# Patient Record
Sex: MALE | Race: WHITE | NOT HISPANIC OR LATINO | URBAN - METROPOLITAN AREA
[De-identification: names, ages, dates, MRNs, and addresses within clinical notes are randomized per-mention and may not be internally consistent; named-entity substitution may affect disease eponyms.]

---

## 2022-09-06 ENCOUNTER — HOSPITAL ENCOUNTER (OUTPATIENT)
Dept: PHYSICAL THERAPY | Facility: HOSPITAL | Age: 62
Setting detail: THERAPIES SERIES
Discharge: HOME | End: 2022-09-06

## 2022-09-06 DIAGNOSIS — Z96.612 S/P REVERSE TOTAL SHOULDER ARTHROPLASTY, LEFT: Primary | ICD-10-CM

## 2022-09-06 PROCEDURE — 97140 MANUAL THERAPY 1/> REGIONS: CPT | Performed by: PHYSICAL THERAPIST

## 2022-09-06 PROCEDURE — 97161 PT EVAL LOW COMPLEX 20 MIN: CPT | Performed by: PHYSICAL THERAPIST

## 2022-09-06 NOTE — THERAPY EVALUATION
Outpatient Physical Therapy Ortho Initial Evaluation   [place]     Patient Name: Son Flannery  : 1960  MRN: [id number]  Today's Date: 2022      Visit Date: 2022    There is no problem list on file for this patient.       No past medical history on file.     No past surgical history on file.    Visit Dx:     ICD-10-CM ICD-9-CM   1. S/P reverse total shoulder arthroplasty, left  Z96.612 V43.61          Patient History     Row Name 22 1000             History    Chief Complaint Difficulty with daily activities;Tightness;Pain  -GC      Type of Pain Shoulder pain  left  -GC      Date Current Problem(s) Began 22  -GC      Brief Description of Current Complaint Pt reports several injuries to his left shoudler in the past with the most recent being several months ago when he slipped on a wet ramp while carrying a 50# bag of corn. He tried to catch himself with his left UE and felt a pull/pop in the left shoulder. He was seen by Dr. Ma who did an MRI and found a FCT and long head of the biceps tear. He underwent a left reverse TSA on . He has been in a sling since surgery. He is now refered for therapy.  -GC      Patient/Caregiver Goals Relieve pain;Return to prior level of function;Improve mobility;Improve strength  -GC      Patient's Rating of General Health Good  -GC      Hand Dominance right-handed  -GC      Occupation/sports/leisure activities shipping/, enjoys yard work  -      What clinical tests have you had for this problem? MRI  -GC      Results of Clinical Tests RCT, long head of biceps tear  -GC              Pain     Pain Location Shoulder  left  -GC      Pain at Present 0  no pain at rest  -GC      Pain at Best 0  -GC      Pain at Worst 8  -GC      Pain Frequency Intermittent  -GC      Pain Description Aching;Sharp;Shooting  -GC      What Performance Factors Make the Current Problem(s) WORSE? pt c/o momentary sharp, shooting pain when he moves his  left UE  -GC      What Performance Factors Make the Current Problem(s) BETTER? Pt has no pain at rest  -GC      Difficulties at work? pt is currently off work due to this surgery  -GC      Difficulties with ADL's? Pt requires assistance with dressing, bathing, grooming tasks at this time  -GC              Daily Activities    Primary Language English  -      How does patient learn best? Listening;Reading  -GC      Teaching needs identified Home Exercise Program;Management of Condition  -GC      Patient is concerned about/has problems with Difficulty with self care (i.e. bathing, dressing, toileting:;Grasping objects lifting;Performing home management (household chores, shopping, care of dependents);Performing job responsibilities/community activities (work, school,;Reaching over head;Repetitive movements of the hand, arm, shoulder  -GC      Does patient have problems with the following? None  -GC      Barriers to learning None  -GC      Functional Status bathing;dressing;grooming;mobility issues preventing performance of daily activities  -GC      Pt Participated in POC and Goals Yes  -GC              Safety    Are you being hurt, hit, or frightened by anyone at home or in your life? No  -GC      Are you being neglected by a caregiver No  -GC            User Key  (r) = Recorded By, (t) = Taken By, (c) = Cosigned By    Initials Name Provider Type    GC Orlando Mccracken, PT Physical Therapist                 PT Ortho     Row Name 09/06/22 1000       Posture/Observations    Posture/Observations Comments Pt initially seen with left UE in sling. He still has post-op dressing in place. There is mild periscapular, deltoid, and biceps atrophy  -GC       Left Upper Ext    Lt Shoulder Abduction PROM 97 degrees  -GC    Lt Shoulder Flexion PROM 98 degrees  -GC    Lt Shoulder External Rotation PROM 41 degrees  -GC    Lt Shoulder Internal Rotation PROM 41 degrees  -GC    Lt Elbow Extension/Flexion AROM WFL  -GC    Lt Elbow  Supination AROM WFL  -GC    Lt Elbow Pronation AROM WFL  -GC    Lt Wrist Flexion AROM WFL  -GC    Lt Wrist Extension AROM WFL  -GC       MMT (Manual Muscle Testing)    General MMT Comments no MMT performed due to post-op protocol  -       Sensation    Light Touch No apparent deficits  -          User Key  (r) = Recorded By, (t) = Taken By, (c) = Cosigned By    Initials Name Provider Type     Orlando Mccracken PT Physical Therapist                            Therapy Education  Given: HEP, Symptoms/condition management, Pain management  Program: New  How Provided: Verbal, Demonstration  Provided to: Patient, Caregiver  Level of Understanding: Teach back education performed, Verbalized, Demonstrated      PT OP Goals     Row Name 09/06/22 1000          PT Short Term Goals    STG Date to Achieve 10/04/22  -     STG 1 Decreae left shoulder pain to 4-5/10 with movement.  -     STG 2 Increase PROM of left shoulder to 145 degrees FLEX and ABD and 60 degrees ER and IR with testing.  -     STG 3 Increase AROM of left shoulder to 125 degrees FLEX and ABD and 45 degrees ER and IR with testing.  -     STG 4 Pt will be indpendent with his HEP issued by this therapist.  -            Long Term Goals    LTG Date to Achieve 11/01/22  -     LTG 1 Decreae left shoulder pain to 0-1/10 with movement.  -     LTG 2 Increase PROM of left shoulder to 160 degrees FLEX and ABD and 75 degrees ER and IR with testing.  -     LTG 3 Increase AROM of left shoulder to 150 degrees FLEX and ABD and 60 degrees ER and IR with testing.  -     LTG 4 Increase left shoulder girdle strength to at least 4+/5 all planes with testing.  -     LTG 5 Pt will be independent with all ADLs and have a Quick Dash score < 15.  -            Time Calculation    PT Goal Re-Cert Due Date 10/04/22  -           User Key  (r) = Recorded By, (t) = Taken By, (c) = Cosigned By    Initials Name Provider Type     Orlando Mccracken PT Physical Therapist                  PT Assessment/Plan     Row Name 09/06/22 1000          PT Assessment    Functional Limitations Limitation in home management;Limitations in community activities;Limitations in functional capacity and performance;Performance in leisure activities;Performance in self-care ADL;Performance in work activities  -     Impairments Range of motion;Pain;Muscle strength  -     Assessment Comments Pt presents one week s/p reverse TSA left shoulder. He has pain that he rates up to 8/10 when he gets the quick jolts of pain associated with movement of his shoulder. He has the expected decrease in his left UE ROM and strength as well as limited function due to the surgery.  -     Rehab Potential Good  -GC     Patient/caregiver participated in establishment of treatment plan and goals Yes  -GC     Patient would benefit from skilled therapy intervention Yes  -GC            PT Plan    PT Frequency 1x/week;2x/week  -     Predicted Duration of Therapy Intervention (PT) 8 weeks  -     Planned CPT's? PT EVAL LOW COMPLEXITY: 93314;PT THER PROC EA 15 MIN: 18253;PT MANUAL THERAPY EA 15 MIN: 31645;PT HOT OR COLD PACK TREAT MCARE;PT ELECTRICAL STIM UNATTEND:   -           User Key  (r) = Recorded By, (t) = Taken By, (c) = Cosigned By    Initials Name Provider Type     Orlando Mccracken, PT Physical Therapist                 Modalities     Row Name 09/06/22 1000             Moist Heat    MH Applied Yes  -      Location left shoulder with pt supine and pillow under left elbow and forearm  -      PT Moist Heat Minutes 10  -GC      MH Prior to Rx Yes  -            User Key  (r) = Recorded By, (t) = Taken By, (c) = Cosigned By    Initials Name Provider Type     Orlando Mccracken, PT Physical Therapist                Manual Rx (last 36 hours)     Manual Treatments     Row Name 09/06/22 1000             Manual Rx 1    Manual Rx 1 Location left shoulderr  -      Manual Rx 1 Type passive stretches in FLEX-ABD-ER-IR   -      Manual Rx 1 Duration 15 min  -            User Key  (r) = Recorded By, (t) = Taken By, (c) = Cosigned By    Initials Name Provider Type     Orlando Mccracken, PT Physical Therapist                            Outcome Measure Options: Quick DASH  Quick DASH  Open a tight or new jar.: Severe Difficulty  Do heavy household chores (e.g., wash walls, wash floors): Unable  Carry a shopping bag or briefcase: Unable  Wash your back: Unable  Use a knife to cut food: Severe Difficulty  Recreational activities in which you take some force or impact through your arm, should or hand (e.g. golf, hammering, tennis, etc.): Unable  During the past week, to what extent has your arm, shoulder, or hand problem interfered with your normal social activites with family, friends, neighbors or groups?: Extremely  During the past week, were you limited in your work or other regular daily activities as a result of your arm, shoulder or hand problem?: Unable  Arm, Shoulder, or hand pain: Moderate  Tingling (pins and needles) in your arm, shoulder, or hand: Mild  During the past week, how much difficulty have you had sleeping because of the pain in your arm, shoulder or hand?: Moderate Difficiculty  Number of Questions Answered: 11  Quick DASH Score: 79.55         Time Calculation:     Start Time: 1000  Stop Time: 1100  Time Calculation (min): 60 min  Untimed Charges  PT Moist Heat Minutes: 10  Total Minutes  Untimed Charges Total Minutes: 10   Total Minutes: 10     Therapy Charges for Today     Code Description Service Date Service Provider Modifiers Qty    42433675033 HC PT EVAL LOW COMPLEXITY 2 9/6/2022 Orlando Mccracken, PT GP 1    88085837977 HC PT MANUAL THERAPY EA 15 MIN 9/6/2022 Orlando Mccracken, PT GP 1          PT G-Codes  Outcome Measure Options: Quick DASH  Quick DASH Score: 79.55         Orlando Mccracken PT  9/6/2022

## 2022-09-09 ENCOUNTER — HOSPITAL ENCOUNTER (OUTPATIENT)
Dept: PHYSICAL THERAPY | Facility: HOSPITAL | Age: 62
Setting detail: THERAPIES SERIES
Discharge: HOME | End: 2022-09-09

## 2022-09-09 DIAGNOSIS — Z96.612 S/P REVERSE TOTAL SHOULDER ARTHROPLASTY, LEFT: Primary | ICD-10-CM

## 2022-09-09 PROCEDURE — 97140 MANUAL THERAPY 1/> REGIONS: CPT | Performed by: PHYSICAL THERAPIST

## 2022-09-09 NOTE — THERAPY TREATMENT NOTE
Outpatient Physical Therapy Ortho Treatment Note   [place]     Patient Name: Son Flannery  : 1960  MRN: [id number]  Today's Date: 2022      Visit Date: 2022    Visit Dx:    ICD-10-CM ICD-9-CM   1. S/P reverse total shoulder arthroplasty, left  Z96.612 V43.61       There is no problem list on file for this patient.       No past medical history on file.     No past surgical history on file.                     PT Assessment/Plan     Row Name 22 1145          PT Assessment    Assessment Comments Pt is doing well with increased shoulder range noted with his stretches.  -GC            PT Plan    PT Plan Comments Pt will be seen 2x week for stretching/ROM and progressing to strengthening per protocol.  -GC           User Key  (r) = Recorded By, (t) = Taken By, (c) = Cosigned By    Initials Name Provider Type    Orlando Snider, PT Physical Therapist                 Modalities     Row Name 22 1145             Subjective Comments    Subjective Comments Pt states her was not very sore after his first visit.  -GC              Moist Heat    MH Applied Yes  -GC      Location left shoulder with pt supine and pillow under left elbow and forearm  -GC      PT Moist Heat Minutes 10  -GC      MH Prior to Rx Yes  -GC              Functional Testing    Outcome Measure Options Quick DASH  -GC            User Key  (r) = Recorded By, (t) = Taken By, (c) = Cosigned By    Initials Name Provider Type    Orlando Snider, PT Physical Therapist               OP Exercises     Row Name 22 1145             Subjective Comments    Subjective Comments Pt states her was not very sore after his first visit.  -GC            User Key  (r) = Recorded By, (t) = Taken By, (c) = Cosigned By    Initials Name Provider Type    Orlando Snider, PT Physical Therapist                         Manual Rx (last 36 hours)     Manual Treatments     Row Name 22 1145             Manual Rx 1    Manual Rx 1 Location left  shoulderr  -GC      Manual Rx 1 Type passive stretches in FLEX-ABD-ER-IR  -GC      Manual Rx 1 Duration 15 min  -GC            User Key  (r) = Recorded By, (t) = Taken By, (c) = Cosigned By    Initials Name Provider Type     Orlando Mccracken, PT Physical Therapist                         Outcome Measure Options: Quick DASH         Time Calculation:   Start Time: 1145  Stop Time: 1223  Time Calculation (min): 38 min  Untimed Charges  PT Moist Heat Minutes: 10  Total Minutes  Untimed Charges Total Minutes: 10   Total Minutes: 10  Therapy Charges for Today     Code Description Service Date Service Provider Modifiers Qty    44203652440 HC PT MANUAL THERAPY EA 15 MIN 9/9/2022 Orlando Mccracken, PT GP 1          PT G-Codes  Outcome Measure Options: Quick DASH         Orlando Mccracken PT  9/9/2022

## 2022-09-13 ENCOUNTER — APPOINTMENT (OUTPATIENT)
Dept: PHYSICAL THERAPY | Facility: HOSPITAL | Age: 62
End: 2022-09-13

## 2022-09-16 ENCOUNTER — HOSPITAL ENCOUNTER (OUTPATIENT)
Dept: PHYSICAL THERAPY | Facility: HOSPITAL | Age: 62
Setting detail: THERAPIES SERIES
Discharge: HOME | End: 2022-09-16

## 2022-09-16 DIAGNOSIS — Z96.612 S/P REVERSE TOTAL SHOULDER ARTHROPLASTY, LEFT: Primary | ICD-10-CM

## 2022-09-16 PROCEDURE — 97140 MANUAL THERAPY 1/> REGIONS: CPT | Performed by: PHYSICAL THERAPIST

## 2022-09-16 NOTE — THERAPY TREATMENT NOTE
Outpatient Physical Therapy Ortho Treatment Note   [place]     Patient Name: Son Flannery  : 1960  MRN: [id number]  Today's Date: 2022      Visit Date: 2022    Visit Dx:    ICD-10-CM ICD-9-CM   1. S/P reverse total shoulder arthroplasty, left  Z96.612 V43.61       There is no problem list on file for this patient.       No past medical history on file.     No past surgical history on file.                     PT Assessment/Plan     Row Name 22 1130          PT Assessment    Assessment Comments Pt continues to show increasing shoulder range with his stretching.  -GC            PT Plan    PT Plan Comments Will continue 2x week for passive stretches. Will begin AAROM end of next week.  -GC           User Key  (r) = Recorded By, (t) = Taken By, (c) = Cosigned By    Initials Name Provider Type    Orlando Snider, PT Physical Therapist                 Modalities     Row Name 22 1130             Subjective Comments    Subjective Comments Pt states the doctor was pleased with his progress.  -GC              Moist Heat    MH Applied Yes  -GC      Location left shoulder with pt supine and pillow under left elbow and forearm  -GC      PT Moist Heat Minutes 10  -GC      MH Prior to Rx Yes  -GC              Functional Testing    Outcome Measure Options Quick DASH  -GC            User Key  (r) = Recorded By, (t) = Taken By, (c) = Cosigned By    Initials Name Provider Type    Orlando Snider, PT Physical Therapist               OP Exercises     Row Name 22 1130             Subjective Comments    Subjective Comments Pt states the doctor was pleased with his progress.  -GC            User Key  (r) = Recorded By, (t) = Taken By, (c) = Cosigned By    Initials Name Provider Type    Orlando Snider, PT Physical Therapist                         Manual Rx (last 36 hours)     Manual Treatments     Row Name 22 1130             Manual Rx 1    Manual Rx 1 Location left shoulderr  -GC       Manual Rx 1 Type passive stretches in FLEX-ABD-ER-IR  -GC      Manual Rx 1 Duration 15 min  -            User Key  (r) = Recorded By, (t) = Taken By, (c) = Cosigned By    Initials Name Provider Type    GC Orlando Mccracken, PT Physical Therapist                         Outcome Measure Options: Quick DASH         Time Calculation:   Start Time: 1130  Stop Time: 1206  Time Calculation (min): 36 min  Untimed Charges  PT Moist Heat Minutes: 10  Total Minutes  Untimed Charges Total Minutes: 10   Total Minutes: 10  Therapy Charges for Today     Code Description Service Date Service Provider Modifiers Qty    89371599730 HC PT MANUAL THERAPY EA 15 MIN 9/16/2022 Orlando Mccracken, PT GP 1          PT G-Codes  Outcome Measure Options: Quick DASH         Orlando Mccracken PT  9/16/2022

## 2022-09-19 ENCOUNTER — HOSPITAL ENCOUNTER (OUTPATIENT)
Dept: PHYSICAL THERAPY | Facility: HOSPITAL | Age: 62
Setting detail: THERAPIES SERIES
Discharge: HOME | End: 2022-09-19

## 2022-09-19 DIAGNOSIS — Z96.612 S/P REVERSE TOTAL SHOULDER ARTHROPLASTY, LEFT: Primary | ICD-10-CM

## 2022-09-19 PROCEDURE — 97140 MANUAL THERAPY 1/> REGIONS: CPT | Performed by: PHYSICAL THERAPIST

## 2022-09-19 NOTE — THERAPY TREATMENT NOTE
Outpatient Physical Therapy Ortho Treatment Note  [place]     Patient Name: Son Flannery  : 1960  MRN: [id number]  Today's Date: 2022      Visit Date: 2022    Visit Dx:    ICD-10-CM ICD-9-CM   1. S/P reverse total shoulder arthroplasty, left  Z96.612 V43.61       There is no problem list on file for this patient.       No past medical history on file.     No past surgical history on file.                     PT Assessment/Plan     Row Name 22 1010          PT Assessment    Assessment Comments Pt continues to have very good PROM of her shoulder.  -GC            PT Plan    PT Plan Comments Pt will begin AAROM later this week.  -GC           User Key  (r) = Recorded By, (t) = Taken By, (c) = Cosigned By    Initials Name Provider Type    Orlando Snider, MAGALIS Physical Therapist                 Modalities     Row Name 22 1010             Subjective Comments    Subjective Comments Pt states his shoulder is feeling pretty good.  -GC              Moist Heat    MH Applied Yes  -GC      Location left shoulder with pt supine and pillow under left elbow and forearm  -GC      PT Moist Heat Minutes 10  -GC      MH Prior to Rx Yes  -GC              Functional Testing    Outcome Measure Options Quick DASH  -GC            User Key  (r) = Recorded By, (t) = Taken By, (c) = Cosigned By    Initials Name Provider Type    Orlando Snider, PT Physical Therapist               OP Exercises     Row Name 22 1010             Subjective Comments    Subjective Comments Pt states his shoulder is feeling pretty good.  -GC            User Key  (r) = Recorded By, (t) = Taken By, (c) = Cosigned By    Initials Name Provider Type    Orlando Snider, PT Physical Therapist                         Manual Rx (last 36 hours)     Manual Treatments     Row Name 22 1010             Manual Rx 1    Manual Rx 1 Location left shoulderr  -GC      Manual Rx 1 Type passive stretches in FLEX-ABD-ER-IR  -GC       Manual Rx 1 Duration 15 min  -            User Key  (r) = Recorded By, (t) = Taken By, (c) = Cosigned By    Initials Name Provider Type    GC Orlando Mccracken, PT Physical Therapist                         Outcome Measure Options: Quick DASH         Time Calculation:   Start Time: 1010  Stop Time: 1047  Time Calculation (min): 37 min  Untimed Charges  PT Moist Heat Minutes: 10  Total Minutes  Untimed Charges Total Minutes: 10   Total Minutes: 10  Therapy Charges for Today     Code Description Service Date Service Provider Modifiers Qty    32806924393 HC PT MANUAL THERAPY EA 15 MIN 9/19/2022 Orlando Mccracken, PT GP 1          PT G-Codes  Outcome Measure Options: Quick VALE Mccracken PT  9/19/2022

## 2022-09-22 ENCOUNTER — HOSPITAL ENCOUNTER (OUTPATIENT)
Dept: PHYSICAL THERAPY | Facility: HOSPITAL | Age: 62
Setting detail: THERAPIES SERIES
Discharge: HOME | End: 2022-09-22

## 2022-09-22 DIAGNOSIS — Z96.612 S/P REVERSE TOTAL SHOULDER ARTHROPLASTY, LEFT: Primary | ICD-10-CM

## 2022-09-22 PROCEDURE — 97140 MANUAL THERAPY 1/> REGIONS: CPT | Performed by: PHYSICAL THERAPIST

## 2022-09-22 NOTE — THERAPY TREATMENT NOTE
Outpatient Physical Therapy Ortho Treatment Note   [place]     Patient Name: Son Flannery  : 1960  MRN: [id number]  Today's Date: 2022      Visit Date: 2022    Visit Dx:    ICD-10-CM ICD-9-CM   1. S/P reverse total shoulder arthroplasty, left  Z96.612 V43.61       There is no problem list on file for this patient.       No past medical history on file.     No past surgical history on file.                     PT Assessment/Plan     Row Name 22 1130          PT Assessment    Assessment Comments Pt tolerated AAROM exercises well.  -GC            PT Plan    PT Plan Comments Will continue therapy 2x weekly.  -GC           User Key  (r) = Recorded By, (t) = Taken By, (c) = Cosigned By    Initials Name Provider Type    GC Orlando Mccracken, PT Physical Therapist                 Modalities     Row Name 22 1130             Subjective Comments    Subjective Comments Pt voices no complaints regarding his shoulder.  -GC              Moist Heat    MH Applied Yes  -GC      Location left shoulder with pt supine and pillow under left elbow and forearm  -GC      PT Moist Heat Minutes 10  -GC      MH Prior to Rx Yes  -GC              Functional Testing    Outcome Measure Options Quick DASH  -GC            User Key  (r) = Recorded By, (t) = Taken By, (c) = Cosigned By    Initials Name Provider Type    GC Orlando Mccracken, PT Physical Therapist               OP Exercises     Row Name 22 1130             Subjective Comments    Subjective Comments Pt voices no complaints regarding his shoulder.  -GC              Exercise 1    Exercise Name 1 Pulley-FLEX  -GC      Cueing 1 Verbal;Tactile;Demo  -GC      Time 1 4 min  -GC              Exercise 2    Exercise Name 2 Pulley-scaption  -GC      Cueing 2 Verbal;Tactile  -GC      Time 2 4 min  -GC            User Key  (r) = Recorded By, (t) = Taken By, (c) = Cosigned By    Initials Name Provider Type    GC Orlando Mccracken, PT Physical Therapist                          Manual Rx (last 36 hours)     Manual Treatments     Row Name 09/22/22 1130             Manual Rx 1    Manual Rx 1 Location left shoulderr  -GC      Manual Rx 1 Type passive stretches in FLEX-ABD-ER-IR  -GC      Manual Rx 1 Duration 15 min  -GC            User Key  (r) = Recorded By, (t) = Taken By, (c) = Cosigned By    Initials Name Provider Type    GC Orlando Mccracken, PT Physical Therapist                         Outcome Measure Options: Quick DASH         Time Calculation:   Start Time: 1130  Stop Time: 1214  Time Calculation (min): 44 min  Untimed Charges  PT Moist Heat Minutes: 10  Total Minutes  Untimed Charges Total Minutes: 10   Total Minutes: 10  Therapy Charges for Today     Code Description Service Date Service Provider Modifiers Qty    34523471872 HC PT MANUAL THERAPY EA 15 MIN 9/22/2022 Orlando Mccracken, PT GP 1          PT G-Codes  Outcome Measure Options: Quick VALE Mccracken PT  9/22/2022

## 2022-09-27 ENCOUNTER — HOSPITAL ENCOUNTER (OUTPATIENT)
Dept: PHYSICAL THERAPY | Facility: HOSPITAL | Age: 62
Setting detail: THERAPIES SERIES
Discharge: HOME | End: 2022-09-27

## 2022-09-27 DIAGNOSIS — Z96.612 S/P REVERSE TOTAL SHOULDER ARTHROPLASTY, LEFT: Primary | ICD-10-CM

## 2022-09-27 PROCEDURE — 97140 MANUAL THERAPY 1/> REGIONS: CPT | Performed by: PHYSICAL THERAPIST

## 2022-09-27 PROCEDURE — 97110 THERAPEUTIC EXERCISES: CPT | Performed by: PHYSICAL THERAPIST

## 2022-09-27 NOTE — THERAPY TREATMENT NOTE
Outpatient Physical Therapy Ortho Treatment Note   [place]     Patient Name: Son Flannery  : 1960  MRN: [id number]  Today's Date: 2022      Visit Date: 2022    Visit Dx:    ICD-10-CM ICD-9-CM   1. S/P reverse total shoulder arthroplasty, left  Z96.612 V43.61       There is no problem list on file for this patient.       No past medical history on file.     No past surgical history on file.                     PT Assessment/Plan     Row Name 22 1015          PT Assessment    Assessment Comments Pt is doing well with inccreased PROM and AAROM o fhis left shoulder noted with his stretches.  -GC            PT Plan    PT Plan Comments Pt is to continue his HEP of stretches 1-2x daily. Has MD follow up this afternoon. Will continue as advised.  -GC           User Key  (r) = Recorded By, (t) = Taken By, (c) = Cosigned By    Initials Name Provider Type    GC Orlando Mccracken, PT Physical Therapist                 Modalities     Row Name 22 1015             Subjective Comments    Subjective Comments Pt states his shoulder is feeling pretty good. Just has a little soreness.  -GC              Moist Heat    MH Applied Yes  -GC      Location left shoulder with pt supine and pillow under left elbow and forearm  -GC      PT Moist Heat Minutes 10  -GC      MH Prior to Rx Yes  -GC              Functional Testing    Outcome Measure Options Quick DASH  -GC            User Key  (r) = Recorded By, (t) = Taken By, (c) = Cosigned By    Initials Name Provider Type    GC Orlando Mccracken, PT Physical Therapist               OP Exercises     Row Name 22 1015             Subjective Comments    Subjective Comments Pt states his shoulder is feeling pretty good. Just has a little soreness.  -GC              Exercise 1    Exercise Name 1 Pulley-FLEX  -GC      Cueing 1 Verbal;Tactile;Demo  -GC      Time 1 4 min  -GC              Exercise 2    Exercise Name 2 Pulley-scaption  -GC      Cueing 2 Verbal;Tactile   -GC      Time 2 4 min  -GC              Exercise 3    Exercise Name 3 Cane stretch-FLEX  -GC      Cueing 3 Verbal;Tactile;Demo  -GC      Reps 3 15  -GC      Time 3 5 secs  -GC              Exercise 4    Exercise Name 4 Cane stretch-ABD  -GC      Cueing 4 Verbal;Tactile;Demo  -GC      Reps 4 15  -GC      Time 4 5 secs  -GC              Exercise 5    Exercise Name 5 Cane stretch-ER  -GC      Cueing 5 Verbal;Tactile;Demo  -GC      Reps 5 15  -GC      Time 5 5 secs  -GC            User Key  (r) = Recorded By, (t) = Taken By, (c) = Cosigned By    Initials Name Provider Type     Orlando Mccracken, PT Physical Therapist                         Manual Rx (last 36 hours)     Manual Treatments     Row Name 09/27/22 1015             Manual Rx 1    Manual Rx 1 Location left shoulderr  -GC      Manual Rx 1 Type passive stretches in FLEX-ABD-ER-IR  -GC      Manual Rx 1 Duration 15 min  -GC            User Key  (r) = Recorded By, (t) = Taken By, (c) = Cosigned By    Initials Name Provider Type     Orlando Mccracken, PT Physical Therapist                         Outcome Measure Options: Quick DASH         Time Calculation:   Start Time: 1015  Stop Time: 1100  Time Calculation (min): 45 min  Untimed Charges  PT Moist Heat Minutes: 10  Total Minutes  Untimed Charges Total Minutes: 10   Total Minutes: 10  Therapy Charges for Today     Code Description Service Date Service Provider Modifiers Qty    44205937432  PT THER PROC EA 15 MIN 9/27/2022 Orlando Mccracken, PT GP 1    70413440896 HC PT MANUAL THERAPY EA 15 MIN 9/27/2022 Orlando Mccracken, PT GP 1          PT G-Codes  Outcome Measure Options: Quick DASH         Orlando Mccracken PT  9/27/2022

## 2022-09-29 ENCOUNTER — HOSPITAL ENCOUNTER (OUTPATIENT)
Dept: PHYSICAL THERAPY | Facility: HOSPITAL | Age: 62
Setting detail: THERAPIES SERIES
Discharge: HOME | End: 2022-09-29

## 2022-09-29 DIAGNOSIS — Z96.612 S/P REVERSE TOTAL SHOULDER ARTHROPLASTY, LEFT: Primary | ICD-10-CM

## 2022-09-29 PROCEDURE — 97110 THERAPEUTIC EXERCISES: CPT | Performed by: PHYSICAL THERAPIST

## 2022-09-29 PROCEDURE — 97140 MANUAL THERAPY 1/> REGIONS: CPT | Performed by: PHYSICAL THERAPIST

## 2022-09-29 NOTE — THERAPY TREATMENT NOTE
Outpatient Physical Therapy Ortho Treatment Note   [place]     Patient Name: Son Flannery  : 1960  MRN: [id number]  Today's Date: 2022      Visit Date: 2022    Visit Dx:    ICD-10-CM ICD-9-CM   1. S/P reverse total shoulder arthroplasty, left  Z96.612 V43.61       There is no problem list on file for this patient.       No past medical history on file.     No past surgical history on file.     PT Ortho     Row Name 22 1115       Posture/Observations    Posture/Observations Comments Pt is now out of sling per MD instruction  -GC          User Key  (r) = Recorded By, (t) = Taken By, (c) = Cosigned By    Initials Name Provider Type    Orlando Snider, PT Physical Therapist                             PT Assessment/Plan     Row Name 22 111          PT Assessment    Assessment Comments Pt is doing well with increasing shoulder range and good tolerance to his exercise progression.  -GC            PT Plan    PT Plan Comments Pt is to continue his HEP daily.  -GC           User Key  (r) = Recorded By, (t) = Taken By, (c) = Cosigned By    Initials Name Provider Type    Orlando Snider, PT Physical Therapist                 Modalities     Row Name 22 1115             Subjective Comments    Subjective Comments Pt states his shoulder is feeling pretty good.  -GC              Moist Heat    MH Applied Yes  -GC      Location left shoulder with pt supine and pillow under left elbow and forearm  -GC      PT Moist Heat Minutes 10  -GC      MH Prior to Rx Yes  -GC              Functional Testing    Outcome Measure Options Quick DASH  -GC            User Key  (r) = Recorded By, (t) = Taken By, (c) = Cosigned By    Initials Name Provider Type    Orlando Snider, PT Physical Therapist               OP Exercises     Row Name 22 1115             Subjective Comments    Subjective Comments Pt states his shoulder is feeling pretty good.  -GC              Exercise 1    Exercise Name 1  Pulley-FLEX  -GC      Cueing 1 Verbal;Tactile;Demo  -GC      Time 1 4 min  -GC              Exercise 2    Exercise Name 2 Pulley-scaption  -GC      Cueing 2 Verbal;Tactile  -GC      Time 2 4 min  -GC              Exercise 3    Exercise Name 3 Cane stretch-FLEX  -GC      Cueing 3 Verbal;Tactile;Demo  -GC      Reps 3 15  -GC      Time 3 5 secs  -GC              Exercise 4    Exercise Name 4 Cane stretch-ABD  -GC      Cueing 4 Verbal;Tactile;Demo  -GC      Reps 4 15  -GC      Time 4 5 secs  -GC              Exercise 5    Exercise Name 5 Cane stretch-ER  -GC      Cueing 5 Verbal;Tactile;Demo  -GC      Reps 5 15  -GC      Time 5 5 secs  -GC              Exercise 6    Exercise Name 6 Shoulder ER vs theraband  -GC      Cueing 6 Verbal;Tactile;Demo  -GC      Reps 6 25  -GC      Time 6 red  -GC              Exercise 7    Exercise Name 7 Shoulder IR vs theraband  -GC      Cueing 7 Verbal;Tactile;Demo  -GC      Reps 7 25  -GC      Time 7 red  -GC              Exercise 8    Exercise Name 8 Scaption Down  -GC      Cueing 8 Verbal;Tactile;Demo  -GC      Reps 8 25  -GC              Exercise 9    Exercise Name 9 Scaption Up  -GC      Cueing 9 Verbal;Tactile  -GC      Reps 9 25  -GC            User Key  (r) = Recorded By, (t) = Taken By, (c) = Cosigned By    Initials Name Provider Type    GC Orlando Mccracken, PT Physical Therapist                         Manual Rx (last 36 hours)     Manual Treatments     Row Name 09/29/22 1115             Manual Rx 1    Manual Rx 1 Location left shoulderr  -GC      Manual Rx 1 Type passive stretches in FLEX-ABD-ER-IR  -GC      Manual Rx 1 Duration 15 min  -GC            User Key  (r) = Recorded By, (t) = Taken By, (c) = Cosigned By    Initials Name Provider Type    GC Orlando Mccracken, PT Physical Therapist                         Outcome Measure Options: Quick DASH         Time Calculation:   Start Time: 1115  Stop Time: 1204  Time Calculation (min): 49 min  Untimed Charges  PT Moist Heat Minutes:  10  Total Minutes  Untimed Charges Total Minutes: 10   Total Minutes: 10  Therapy Charges for Today     Code Description Service Date Service Provider Modifiers Qty    58406723009 HC PT THER PROC EA 15 MIN 9/29/2022 Orlando Mccracken, PT GP 1    40980916804 HC PT MANUAL THERAPY EA 15 MIN 9/29/2022 Orlando Mccracken, PT GP 1          PT G-Codes  Outcome Measure Options: Quick DASH         Orlando Mccracken, PT  9/29/2022

## 2022-10-04 ENCOUNTER — APPOINTMENT (OUTPATIENT)
Dept: PHYSICAL THERAPY | Facility: HOSPITAL | Age: 62
End: 2022-10-04

## 2022-10-07 ENCOUNTER — HOSPITAL ENCOUNTER (OUTPATIENT)
Dept: PHYSICAL THERAPY | Facility: HOSPITAL | Age: 62
Setting detail: THERAPIES SERIES
Discharge: HOME | End: 2022-10-07

## 2022-10-07 DIAGNOSIS — Z96.612 S/P REVERSE TOTAL SHOULDER ARTHROPLASTY, LEFT: Primary | ICD-10-CM

## 2022-10-07 PROCEDURE — 97110 THERAPEUTIC EXERCISES: CPT | Performed by: PHYSICAL THERAPIST

## 2022-10-07 PROCEDURE — 97140 MANUAL THERAPY 1/> REGIONS: CPT | Performed by: PHYSICAL THERAPIST

## 2022-10-07 NOTE — THERAPY TREATMENT NOTE
Outpatient Physical Therapy Ortho Treatment Note   [place]     Patient Name: Son Flannery  : 1960  MRN: [id number]  Today's Date: 10/7/2022      Visit Date: 10/07/2022    Visit Dx:    ICD-10-CM ICD-9-CM   1. S/P reverse total shoulder arthroplasty, left  Z96.612 V43.61       There is no problem list on file for this patient.       No past medical history on file.     No past surgical history on file.                     PT Assessment/Plan     Row Name 10/07/22 1150          PT Assessment    Assessment Comments Pt is doing well with increasing shoulder range and good toelrance to his gentle strengthening.  -GC            PT Plan    PT Plan Comments Pt is to continue his HEP daily.  -GC           User Key  (r) = Recorded By, (t) = Taken By, (c) = Cosigned By    Initials Name Provider Type    GC Orlando Mccracken, PT Physical Therapist                 Modalities     Row Name 10/07/22 1150             Subjective Comments    Subjective Comments Pt states his shoulder is feeling pretty good.  -GC              Moist Heat    MH Applied Yes  -GC      Location left shoulder with pt supine and pillow under left elbow and forearm  -GC      PT Moist Heat Minutes 10  -GC      MH Prior to Rx Yes  -GC              Functional Testing    Outcome Measure Options Quick DASH  -GC            User Key  (r) = Recorded By, (t) = Taken By, (c) = Cosigned By    Initials Name Provider Type    GC Orlando Mccracken, PT Physical Therapist               OP Exercises     Row Name 10/07/22 1150             Subjective Comments    Subjective Comments Pt states his shoulder is feeling pretty good.  -GC              Exercise 1    Exercise Name 1 Pulley-FLEX  -GC      Cueing 1 Verbal;Tactile;Demo  -GC      Time 1 4 min  -GC              Exercise 2    Exercise Name 2 Pulley-scaption  -GC      Cueing 2 Verbal;Tactile  -GC      Time 2 4 min  -GC              Exercise 3    Exercise Name 3 Cane stretch-FLEX  -GC      Cueing 3 Verbal;Tactile;Demo  -GC       Reps 3 15  -GC      Time 3 5 secs  -GC              Exercise 4    Exercise Name 4 Cane stretch-ABD  -GC      Cueing 4 Verbal;Tactile;Demo  -GC      Reps 4 15  -GC      Time 4 5 secs  -GC              Exercise 5    Exercise Name 5 Cane stretch-ER  -GC      Cueing 5 Verbal;Tactile;Demo  -GC      Reps 5 15  -GC      Time 5 5 secs  -GC              Exercise 6    Exercise Name 6 Shoulder ER vs theraband  -GC      Cueing 6 Verbal;Tactile;Demo  -GC      Reps 6 25  -GC      Time 6 red  -GC              Exercise 7    Exercise Name 7 Shoulder IR vs theraband  -GC      Cueing 7 Verbal;Tactile;Demo  -GC      Reps 7 25  -GC      Time 7 red  -GC              Exercise 8    Exercise Name 8 Scaption Down  -GC      Cueing 8 Verbal;Tactile;Demo  -GC      Reps 8 25  -GC      Time 8 1#  -GC              Exercise 9    Exercise Name 9 Scaption Up  -GC      Cueing 9 Verbal;Tactile  -GC      Reps 9 25  -GC      Time 9 1#  -GC              Exercise 10    Exercise Name 10 shoulder rows vs theraband  -GC      Cueing 10 Verbal;Tactile;Demo  -GC      Reps 10 25  -GC      Time 10 red  -GC              Exercise 11    Exercise Name 11 shoulder EXT vs theraband  -GC      Cueing 11 Verbal;Tactile;Demo  -GC      Reps 11 25  -GC      Time 11 red  -GC            User Key  (r) = Recorded By, (t) = Taken By, (c) = Cosigned By    Initials Name Provider Type     Orlando Mccracken, PT Physical Therapist                         Manual Rx (last 36 hours)     Manual Treatments     Row Name 10/07/22 1150             Manual Rx 1    Manual Rx 1 Location left shoulderr  -GC      Manual Rx 1 Type passive stretches in FLEX-ABD-ER-IR  -GC      Manual Rx 1 Duration 15 min  -GC            User Key  (r) = Recorded By, (t) = Taken By, (c) = Cosigned By    Initials Name Provider Type    GC Costcarlyn, Orlando, PT Physical Therapist                         Outcome Measure Options: Quick DASH         Time Calculation:   Start Time: 1150  Stop Time: 1247  Time Calculation (min):  57 min  Untimed Charges  PT Moist Heat Minutes: 10  Total Minutes  Untimed Charges Total Minutes: 10   Total Minutes: 10  Therapy Charges for Today     Code Description Service Date Service Provider Modifiers Qty    65056898676 HC PT THER PROC EA 15 MIN 10/7/2022 Orlando Mccracken, PT GP 1    18391505476 HC PT MANUAL THERAPY EA 15 MIN 10/7/2022 Orlando Mccracken, PT GP 1          PT G-Codes  Outcome Measure Options: Quick DASH         Orlando Mccracken, PT  10/7/2022

## 2022-10-10 ENCOUNTER — HOSPITAL ENCOUNTER (OUTPATIENT)
Dept: PHYSICAL THERAPY | Facility: HOSPITAL | Age: 62
Setting detail: THERAPIES SERIES
Discharge: HOME | End: 2022-10-10

## 2022-10-10 DIAGNOSIS — Z96.612 S/P REVERSE TOTAL SHOULDER ARTHROPLASTY, LEFT: Primary | ICD-10-CM

## 2022-10-10 PROCEDURE — 97140 MANUAL THERAPY 1/> REGIONS: CPT | Performed by: PHYSICAL THERAPIST

## 2022-10-10 PROCEDURE — 97110 THERAPEUTIC EXERCISES: CPT | Performed by: PHYSICAL THERAPIST

## 2022-10-10 NOTE — THERAPY TREATMENT NOTE
Outpatient Physical Therapy Ortho Treatment Note  [place]     Patient Name: Son Flannery  : 1960  MRN: [id number]  Today's Date: 10/10/2022      Visit Date: 10/10/2022    Visit Dx:    ICD-10-CM ICD-9-CM   1. S/P reverse total shoulder arthroplasty, left  Z96.612 V43.61       There is no problem list on file for this patient.       No past medical history on file.     No past surgical history on file.                     PT Assessment/Plan     Row Name 10/10/22 1200          PT Assessment    Assessment Comments Pt is doing well with good tolerance to his exercise progression.  -GC        PT Plan    PT Plan Comments Pt is to continue his HEP 2x daily.  -GC           User Key  (r) = Recorded By, (t) = Taken By, (c) = Cosigned By    Initials Name Provider Type    Orlando Snider, PT Physical Therapist                   OP Exercises     Row Name 10/10/22 1200             Subjective Comments    Subjective Comments Pt states his shoulder feels pretty good.  -GC         Exercise 1    Exercise Name 1 Pulley-FLEX  -GC      Cueing 1 Verbal;Tactile;Demo  -GC      Time 1 4 min  -GC         Exercise 2    Exercise Name 2 Pulley-scaption  -GC      Cueing 2 Verbal;Tactile  -GC      Time 2 4 min  -GC         Exercise 3    Exercise Name 3 Cane stretch-FLEX  -GC      Cueing 3 Verbal;Tactile;Demo  -GC      Reps 3 15  -GC      Time 3 5 secs  -GC         Exercise 4    Exercise Name 4 Cane stretch-ABD  -GC      Cueing 4 Verbal;Tactile;Demo  -GC      Reps 4 15  -GC      Time 4 5 secs  -GC         Exercise 5    Exercise Name 5 Cane stretch-ER  -GC      Cueing 5 Verbal;Tactile;Demo  -GC      Reps 5 15  -GC      Time 5 5 secs  -GC         Exercise 6    Exercise Name 6 Shoulder ER vs theraband  -GC      Cueing 6 Verbal;Tactile;Demo  -GC      Reps 6 25  -GC      Time 6 green  -GC         Exercise 7    Exercise Name 7 Shoulder IR vs theraband  -GC      Cueing 7 Verbal;Tactile;Demo  -GC      Reps 7 25  -GC      Time 7 green  -GC          Exercise 8    Exercise Name 8 Scaption Down  -GC      Cueing 8 Verbal;Tactile;Demo  -GC      Reps 8 25  -GC      Time 8 1#  -GC         Exercise 9    Exercise Name 9 Scaption Up  -GC      Cueing 9 Verbal;Tactile  -GC      Reps 9 25  -GC      Time 9 1#  -GC         Exercise 10    Exercise Name 10 shoulder rows vs theraband  -GC      Cueing 10 Verbal;Tactile;Demo  -GC      Reps 10 25  -GC      Time 10 green  -GC         Exercise 11    Exercise Name 11 shoulder EXT vs theraband  -GC      Cueing 11 Verbal;Tactile;Demo  -GC      Reps 11 25  -GC      Time 11 green  -GC            User Key  (r) = Recorded By, (t) = Taken By, (c) = Cosigned By    Initials Name Provider Type     Orlando Mccracken, PT Physical Therapist                         Manual Rx (last 36 hours)     Manual Treatments     Row Name 10/10/22 1200             Manual Rx 1    Manual Rx 1 Location left shoulderr  -GC      Manual Rx 1 Type passive stretches in FLEX-ABD-ER-IR  -GC      Manual Rx 1 Duration 15 min  -GC            User Key  (r) = Recorded By, (t) = Taken By, (c) = Cosigned By    Initials Name Provider Type     Orlando Mccracken, PT Physical Therapist                                   Time Calculation:   Start Time: 1200  Stop Time: 1304  Time Calculation (min): 64 min  Therapy Charges for Today     Code Description Service Date Service Provider Modifiers Qty    25475558309  PT THER PROC EA 15 MIN 10/10/2022 Orlando Mccracken, PT GP 1    [id number]  PT MANUAL THERAPY EA 15 MIN 10/10/2022 Orlando Mccracken, PT GP 1                    Orlando Mccracken PT  10/10/2022

## 2022-10-14 ENCOUNTER — HOSPITAL ENCOUNTER (OUTPATIENT)
Dept: PHYSICAL THERAPY | Facility: HOSPITAL | Age: 62
Setting detail: THERAPIES SERIES
Discharge: HOME | End: 2022-10-14

## 2022-10-14 DIAGNOSIS — Z96.612 S/P REVERSE TOTAL SHOULDER ARTHROPLASTY, LEFT: Primary | ICD-10-CM

## 2022-10-14 PROCEDURE — 97140 MANUAL THERAPY 1/> REGIONS: CPT | Performed by: PHYSICAL THERAPIST

## 2022-10-14 PROCEDURE — 97110 THERAPEUTIC EXERCISES: CPT | Performed by: PHYSICAL THERAPIST

## 2022-10-14 NOTE — THERAPY TREATMENT NOTE
Outpatient Physical Therapy [facility] Treatment Note  [place]     Patient Name: Son Flannery  : 1960  MRN: [id number]  Today's Date: 10/14/2022      Visit Date: 10/14/2022    Visit Dx:    ICD-10-CM ICD-9-CM   1. S/P reverse total shoulder arthroplasty, left  Z96.612 V43.61       There is no problem list on file for this patient.       No past medical history on file.     No past surgical history on file.                     PT Assessment/Plan     Row Name 10/14/22 1030          PT Assessment    Assessment Comments Pt is doing well with increasing range and good tolerance to his exercise progression.  -GC        PT Plan    PT Plan Comments Pt is to continue his HEP daily.  -GC           User Key  (r) = Recorded By, (t) = Taken By, (c) = Cosigned By    Initials Name Provider Type    GC Orlando Mccracken, PT Physical Therapist                 Modalities     Row Name 10/14/22 1030             Subjective Comments    Subjective Comments Pt states his shoulder is feleing pretty good.  -GC         Moist Heat    MH Applied Yes  -GC      Location left shoulder with pt supine and pillow under left elbow and forearm  -GC      PT Moist Heat Minutes 10  -GC      MH Prior to Rx Yes  -GC         Functional Testing    Outcome Measure Options Quick DASH  -GC            User Key  (r) = Recorded By, (t) = Taken By, (c) = Cosigned By    Initials Name Provider Type    GC Orlando Mccracken, PT Physical Therapist               OP Exercises     Row Name 10/14/22 1030             Subjective Comments    Subjective Comments Pt states his shoulder is feleing pretty good.  -GC         Exercise 1    Exercise Name 1 Pulley-FLEX  -GC      Cueing 1 Verbal;Tactile;Demo  -GC      Time 1 4 min  -GC         Exercise 2    Exercise Name 2 Pulley-scaption  -GC      Cueing 2 Verbal;Tactile  -GC      Time 2 4 min  -GC         Exercise 3    Exercise Name 3 Cane stretch-FLEX  -GC      Cueing 3 Verbal;Tactile;Demo  -GC      Reps 3 15  -GC      Time 3 5 secs   -GC         Exercise 4    Exercise Name 4 Cane stretch-ABD  -GC      Cueing 4 Verbal;Tactile;Demo  -GC      Reps 4 15  -GC      Time 4 5 secs  -GC         Exercise 5    Exercise Name 5 Cane stretch-ER  -GC      Cueing 5 Verbal;Tactile;Demo  -GC      Reps 5 15  -GC      Time 5 5 secs  -GC         Exercise 6    Exercise Name 6 Shoulder ER vs theraband  -GC      Cueing 6 Verbal;Tactile;Demo  -GC      Reps 6 25  -GC      Time 6 green  -GC         Exercise 7    Exercise Name 7 Shoulder IR vs theraband  -GC      Cueing 7 Verbal;Tactile;Demo  -GC      Reps 7 25  -GC      Time 7 green  -GC         Exercise 8    Exercise Name 8 Scaption Down  -GC      Cueing 8 Verbal;Tactile;Demo  -GC      Reps 8 25  -GC      Time 8 2#  -GC         Exercise 9    Exercise Name 9 Scaption Up  -GC      Cueing 9 Verbal;Tactile  -GC      Reps 9 25  -GC      Time 9 2#  -GC         Exercise 10    Exercise Name 10 shoulder rows vs theraband  -GC      Cueing 10 Verbal;Tactile;Demo  -GC      Reps 10 25  -GC      Time 10 green  -GC         Exercise 11    Exercise Name 11 shoulder EXT vs theraband  -GC      Cueing 11 Verbal;Tactile;Demo  -GC      Reps 11 25  -GC      Time 11 green  -GC         Exercise 12    Exercise Name 12 Sidelying ER  -GC      Cueing 12 Verbal;Tactile  -GC      Reps 12 25  -GC      Time 12 2#  -GC            User Key  (r) = Recorded By, (t) = Taken By, (c) = Cosigned By    Initials Name Provider Type     Orlando Mccracken, PT Physical Therapist                         Manual Rx (last 36 hours)     Manual Treatments     Row Name 10/14/22 1030             Manual Rx 1    Manual Rx 1 Location left shoulderr  -GC      Manual Rx 1 Type passive stretches in FLEX-ABD-ER-IR  -GC      Manual Rx 1 Duration 15 min  -GC            User Key  (r) = Recorded By, (t) = Taken By, (c) = Cosigned By    Initials Name Provider Type    GC Orlando Mccracken, PT Physical Therapist                         Outcome Measure Options: Quick DASH         Time  Calculation:   Start Time: 1030  Stop Time: 1122  Time Calculation (min): 52 min  Untimed Charges  PT Moist Heat Minutes: 10  Total Minutes  Untimed Charges Total Minutes: 10   Total Minutes: 10  Therapy Charges for Today     Code Description Service Date Service Provider Modifiers Qty    05157178714 HC PT THER PROC EA 15 MIN 10/14/2022 Orlando Mccracken, PT GP 1    11680311766 HC PT MANUAL THERAPY EA 15 MIN 10/14/2022 Orlando Mccracken, PT GP 1          PT G-Codes  Outcome Measure Options: Quick DASH         Orlando Mccracken, PT  10/14/2022

## 2022-10-18 ENCOUNTER — HOSPITAL ENCOUNTER (OUTPATIENT)
Dept: PHYSICAL THERAPY | Facility: HOSPITAL | Age: 62
Setting detail: THERAPIES SERIES
Discharge: HOME | End: 2022-10-18

## 2022-10-18 DIAGNOSIS — Z96.612 S/P REVERSE TOTAL SHOULDER ARTHROPLASTY, LEFT: Primary | ICD-10-CM

## 2022-10-18 PROCEDURE — 97140 MANUAL THERAPY 1/> REGIONS: CPT | Performed by: PHYSICAL THERAPIST

## 2022-10-18 PROCEDURE — 97110 THERAPEUTIC EXERCISES: CPT | Performed by: PHYSICAL THERAPIST

## 2022-10-18 NOTE — THERAPY TREATMENT NOTE
Outpatient Physical Therapy Ortho Treatment Note  [facility]     Patient Name: Son Flannery  : 1960  MRN: [id number]  Today's Date: 10/18/2022      Visit Date: 10/18/2022    Visit Dx:    ICD-10-CM ICD-9-CM   1. S/P reverse total shoulder arthroplasty, left  Z96.612 V43.61       There is no problem list on file for this patient.       No past medical history on file.     No past surgical history on file.                     PT Assessment/Plan     Row Name 10/18/22 1130          PT Assessment    Assessment Comments Pt is doing well with good PROM noted with his stretching. Feel soreness is expected with starting strengthening exercises.  -GC        PT Plan    PT Plan Comments Pt is to continue his HEP daily.  -GC           User Key  (r) = Recorded By, (t) = Taken By, (c) = Cosigned By    Initials Name Provider Type    GC Orlando Mccracken, PT Physical Therapist                 Modalities     Row Name 10/18/22 1130             Subjective Comments    Subjective Comments Pt states his shoulder is a little sore.  -GC         Moist Heat    MH Applied Yes  -GC      Location left shoulder with pt supine and pillow under left elbow and forearm  -GC      PT Moist Heat Minutes 10  -GC      MH Prior to Rx Yes  -GC         Functional Testing    Outcome Measure Options Quick DASH  -GC            User Key  (r) = Recorded By, (t) = Taken By, (c) = Cosigned By    Initials Name Provider Type    GC Orlando Mccracken, PT Physical Therapist               OP Exercises     Row Name 10/18/22 1130             Subjective Comments    Subjective Comments Pt states his shoulder is a little sore.  -GC         Exercise 1    Exercise Name 1 Pulley-FLEX  -GC      Cueing 1 Verbal;Tactile;Demo  -GC      Time 1 4 min  -GC         Exercise 2    Exercise Name 2 Pulley-scaption  -GC      Cueing 2 Verbal;Tactile  -GC      Time 2 4 min  -GC         Exercise 3    Exercise Name 3 Cane stretch-FLEX  -GC      Cueing 3 Verbal;Tactile;Demo  -GC      Reps 3  15  -GC      Time 3 5 secs  -GC         Exercise 4    Exercise Name 4 Cane stretch-ABD  -GC      Cueing 4 Verbal;Tactile;Demo  -GC      Reps 4 15  -GC      Time 4 5 secs  -GC         Exercise 5    Exercise Name 5 Cane stretch-ER  -GC      Cueing 5 Verbal;Tactile;Demo  -GC      Reps 5 15  -GC      Time 5 5 secs  -GC         Exercise 6    Exercise Name 6 Shoulder ER vs theraband  -GC      Cueing 6 Verbal;Tactile;Demo  -GC      Reps 6 25  -GC      Time 6 green  -GC         Exercise 7    Exercise Name 7 Shoulder IR vs theraband  -GC      Cueing 7 Verbal;Tactile;Demo  -GC      Reps 7 25  -GC      Time 7 green  -GC         Exercise 8    Exercise Name 8 Scaption Down  -GC      Cueing 8 Verbal;Tactile;Demo  -GC      Reps 8 25  -GC      Time 8 2#  -GC         Exercise 9    Exercise Name 9 Scaption Up  -GC      Cueing 9 Verbal;Tactile  -GC      Reps 9 25  -GC      Time 9 2#  -GC         Exercise 10    Exercise Name 10 shoulder rows vs theraband  -GC      Cueing 10 Verbal;Tactile;Demo  -GC      Reps 10 25  -GC      Time 10 green  -GC         Exercise 11    Exercise Name 11 shoulder EXT vs theraband  -GC      Cueing 11 Verbal;Tactile;Demo  -GC      Reps 11 25  -GC      Time 11 green  -GC         Exercise 12    Exercise Name 12 Sidelying ER  -GC      Cueing 12 Verbal;Tactile  -GC      Reps 12 25  -GC      Time 12 2#  -GC            User Key  (r) = Recorded By, (t) = Taken By, (c) = Cosigned By    Initials Name Provider Type     Orlando Mccracken, PT Physical Therapist                         Manual Rx (last 36 hours)     Manual Treatments     Row Name 10/18/22 1130             Manual Rx 1    Manual Rx 1 Location left shoulderr  -GC      Manual Rx 1 Type passive stretches in FLEX-ABD-ER-IR  -GC      Manual Rx 1 Duration 15 min  -GC            User Key  (r) = Recorded By, (t) = Taken By, (c) = Cosigned By    Initials Name Provider Type    GC Orlando Mccracken, PT Physical Therapist                         Outcome Measure Options:  Quick DASH         Time Calculation:   Start Time: 1130  Stop Time: 1221  Time Calculation (min): 51 min  Untimed Charges  PT Moist Heat Minutes: 10  Total Minutes  Untimed Charges Total Minutes: 10   Total Minutes: 10  Therapy Charges for Today     Code Description Service Date Service Provider Modifiers Qty    43657551668 HC PT THER PROC EA 15 MIN 10/18/2022 Orlando Mccracken, PT GP 1    36105910181 HC PT MANUAL THERAPY EA 15 MIN 10/18/2022 Orlando Mccracken, PT GP 1          PT G-Codes  Outcome Measure Options: Raymond Mccracken PT  10/18/2022

## 2022-10-26 ENCOUNTER — HOSPITAL ENCOUNTER (OUTPATIENT)
Dept: PHYSICAL THERAPY | Facility: HOSPITAL | Age: 62
Setting detail: THERAPIES SERIES
Discharge: HOME | End: 2022-10-26

## 2022-10-26 DIAGNOSIS — Z96.612 S/P REVERSE TOTAL SHOULDER ARTHROPLASTY, LEFT: Primary | ICD-10-CM

## 2022-10-26 PROCEDURE — 97140 MANUAL THERAPY 1/> REGIONS: CPT | Performed by: PHYSICAL THERAPIST

## 2022-10-26 PROCEDURE — 97110 THERAPEUTIC EXERCISES: CPT | Performed by: PHYSICAL THERAPIST

## 2022-10-26 NOTE — THERAPY TREATMENT NOTE
Outpatient Physical Therapy Ortho Treatment Note   [place]     Patient Name: Son Flannery  : 1960  MRN: [id number]  Today's Date: 10/26/2022      Visit Date: 10/26/2022    Visit Dx:    ICD-10-CM ICD-9-CM   1. S/P reverse total shoulder arthroplasty, left  Z96.612 V43.61       There is no problem list on file for this patient.       No past medical history on file.     No past surgical history on file.                     PT Assessment/Plan     Row Name 10/26/22 1215          PT Assessment    Assessment Comments Pt is doing very well with increasing shoulder range and good tolerance to his exercise porgression.  -GC        PT Plan    PT Plan Comments Pt is to continue his HEP daily.  -GC           User Key  (r) = Recorded By, (t) = Taken By, (c) = Cosigned By    Initials Name Provider Type    GC Orlando Mccracken, PT Physical Therapist                 Modalities     Row Name 10/26/22 1215             Subjective Comments    Subjective Comments Pt states his shoulder is doing pretty well.  -GC         Moist Heat    MH Applied Yes  -GC      Location left shoulder with pt supine and pillow under left elbow and forearm  -GC      PT Moist Heat Minutes 10  -GC      MH Prior to Rx Yes  -GC         Functional Testing    Outcome Measure Options Quick DASH  -GC            User Key  (r) = Recorded By, (t) = Taken By, (c) = Cosigned By    Initials Name Provider Type    GC Orlando Mccracken, PT Physical Therapist               OP Exercises     Row Name 10/26/22 1215             Subjective Comments    Subjective Comments Pt states his shoulder is doing pretty well.  -GC         Exercise 1    Exercise Name 1 Pulley-FLEX  -GC      Cueing 1 Verbal;Tactile;Demo  -GC      Time 1 4 min  -GC         Exercise 2    Exercise Name 2 Pulley-scaption  -GC      Cueing 2 Verbal;Tactile  -GC      Time 2 4 min  -GC         Exercise 3    Exercise Name 3 Cane stretch-FLEX  -GC      Cueing 3 Verbal;Tactile;Demo  -GC      Reps 3 15  -GC       Time 3 5 secs  -GC         Exercise 4    Exercise Name 4 Cane stretch-ABD  -GC      Cueing 4 Verbal;Tactile;Demo  -GC      Reps 4 15  -GC      Time 4 5 secs  -GC         Exercise 5    Exercise Name 5 Cane stretch-ER  -GC      Cueing 5 Verbal;Tactile;Demo  -GC      Reps 5 15  -GC      Time 5 5 secs  -GC         Exercise 6    Exercise Name 6 Shoulder ER vs theraband  -GC      Cueing 6 Verbal;Tactile;Demo  -GC      Reps 6 25  -GC      Time 6 green  -GC         Exercise 7    Exercise Name 7 Shoulder IR vs theraband  -GC      Cueing 7 Verbal;Tactile;Demo  -GC      Reps 7 25  -GC      Time 7 green  -GC         Exercise 8    Exercise Name 8 Scaption Down  -GC      Cueing 8 Verbal;Tactile;Demo  -GC      Reps 8 25  -GC      Time 8 3#  -GC         Exercise 9    Exercise Name 9 Scaption Up  -GC      Cueing 9 Verbal;Tactile  -GC      Reps 9 25  -GC      Time 9 3#  -GC         Exercise 10    Exercise Name 10 shoulder rows vs theraband  -GC      Cueing 10 Verbal;Tactile;Demo  -GC      Reps 10 25  -GC      Time 10 green  -GC         Exercise 11    Exercise Name 11 shoulder EXT vs theraband  -GC      Cueing 11 Verbal;Tactile;Demo  -GC      Reps 11 25  -GC      Time 11 green  -GC         Exercise 12    Exercise Name 12 Sidelying ER  -GC      Cueing 12 Verbal;Tactile  -GC      Reps 12 25  -GC      Time 12 3#  -GC         Exercise 13    Exercise Name 13 Prone Hor ABD 90  -GC      Cueing 13 Verbal;Tactile;Demo  -GC      Reps 13 25  -GC      Time 13 1#  -GC         Exercise 14    Exercise Name 14 Prone Hor   -GC      Cueing 14 Verbal;Tactile;Demo  -GC      Reps 14 25  -GC      Time 14 1#  -GC            User Key  (r) = Recorded By, (t) = Taken By, (c) = Cosigned By    Initials Name Provider Type    Orlando Snider PT Physical Therapist                         Manual Rx (last 36 hours)     Manual Treatments     Row Name 10/26/22 1215             Manual Rx 1    Manual Rx 1 Location left shoulder  -GC      Manual Rx 1 Type  passive stretches in FLEX-ABD-ER-IR  -GC      Manual Rx 1 Duration 15 min  -            User Key  (r) = Recorded By, (t) = Taken By, (c) = Cosigned By    Initials Name Provider Type     Orlando Mccracken, PT Physical Therapist                         Outcome Measure Options: Quick DASH         Time Calculation:   Start Time: 1215  Stop Time: 1316  Time Calculation (min): 61 min  Untimed Charges  PT Moist Heat Minutes: 10  Total Minutes  Untimed Charges Total Minutes: 10   Total Minutes: 10  Therapy Charges for Today     Code Description Service Date Service Provider Modifiers Qty    33475821374  PT THER PROC EA 15 MIN 10/26/2022 Orlando Mccracken, PT GP 1    43470547949 HC PT MANUAL THERAPY EA 15 MIN 10/26/2022 Orlando Mccracken, PT GP 1          PT G-Codes  Outcome Measure Options: Quick DASH         Orlando Mccracken PT  10/26/2022

## 2022-11-01 ENCOUNTER — HOSPITAL ENCOUNTER (OUTPATIENT)
Dept: PHYSICAL THERAPY | Facility: HOSPITAL | Age: 62
Setting detail: THERAPIES SERIES
Discharge: HOME | End: 2022-11-01

## 2022-11-01 DIAGNOSIS — Z96.612 S/P REVERSE TOTAL SHOULDER ARTHROPLASTY, LEFT: Primary | ICD-10-CM

## 2022-11-01 PROCEDURE — 97140 MANUAL THERAPY 1/> REGIONS: CPT | Performed by: PHYSICAL THERAPIST

## 2022-11-01 PROCEDURE — 97110 THERAPEUTIC EXERCISES: CPT | Performed by: PHYSICAL THERAPIST

## 2022-11-01 NOTE — THERAPY TREATMENT NOTE
Outpatient Physical Therapy [facility] Treatment Note  [facility]     Patient Name: Son Flannery  : 1960  MRN: [id number]  Today's Date: 2022      Visit Date: 2022    Visit Dx:    ICD-10-CM ICD-9-CM   1. S/P reverse total shoulder arthroplasty, left  Z96.612 V43.61       There is no problem list on file for this patient.       No past medical history on file.     No past surgical history on file.                     PT Assessment/Plan     Row Name 22 1130          PT Assessment    Assessment Comments Pt is doing well with increased shoulder range and good tolerance to his exercise progression.  -GC        PT Plan    PT Plan Comments Pt is to continue her HEP daily.  -GC           User Key  (r) = Recorded By, (t) = Taken By, (c) = Cosigned By    Initials Name Provider Type    Orlando Snider, PT Physical Therapist                 Modalities     Row Name 22 1130             Moist Heat    MH Applied Yes  -GC      Location left shoulder with pt supine and pillow under left elbow and forearm  -GC      PT Moist Heat Minutes 10  -GC      MH Prior to Rx Yes  -GC         Functional Testing    Outcome Measure Options Quick DASH  -GC            User Key  (r) = Recorded By, (t) = Taken By, (c) = Cosigned By    Initials Name Provider Type    Orlando Snider, PT Physical Therapist               OP Exercises     Row Name 22 1130             Subjective Comments    Subjective Comments Pt states his shoudler is doing pretty well.  -GC         Exercise 1    Exercise Name 1 Pulley-FLEX  -GC      Cueing 1 Verbal;Tactile;Demo  -GC      Time 1 4 min  -GC         Exercise 2    Exercise Name 2 Pulley-scaption  -GC      Cueing 2 Verbal;Tactile  -GC      Time 2 4 min  -GC         Exercise 3    Exercise Name 3 Cane stretch-FLEX  -GC      Cueing 3 Verbal;Tactile;Demo  -GC      Reps 3 15  -GC      Time 3 5 secs  -GC         Exercise 4    Exercise Name 4 Cane stretch-ABD  -GC      Cueing 4  Verbal;Tactile;Demo  -GC      Reps 4 15  -GC      Time 4 5 secs  -GC         Exercise 5    Exercise Name 5 Cane stretch-ER  -GC      Cueing 5 Verbal;Tactile;Demo  -GC      Reps 5 15  -GC      Time 5 5 secs  -GC         Exercise 6    Exercise Name 6 Shoulder ER vs theraband  -GC      Cueing 6 Verbal;Tactile;Demo  -GC      Reps 6 25  -GC      Time 6 blue  -GC         Exercise 7    Exercise Name 7 Shoulder IR vs theraband  -GC      Cueing 7 Verbal;Tactile;Demo  -GC      Reps 7 25  -GC      Time 7 blue  -GC         Exercise 8    Exercise Name 8 Scaption Down  -GC      Cueing 8 Verbal;Tactile;Demo  -GC      Reps 8 25  -GC      Time 8 3#  -GC         Exercise 9    Exercise Name 9 Scaption Up  -GC      Cueing 9 Verbal;Tactile  -GC      Reps 9 25  -GC      Time 9 3#  -GC         Exercise 10    Exercise Name 10 shoulder rows vs theraband  -GC      Cueing 10 Verbal;Tactile;Demo  -GC      Reps 10 25  -GC      Time 10 blue  -GC         Exercise 11    Exercise Name 11 shoulder EXT vs theraband  -GC      Cueing 11 Verbal;Tactile;Demo  -GC      Reps 11 25  -GC      Time 11 blue  -GC         Exercise 12    Exercise Name 12 Sidelying ER  -GC      Cueing 12 Verbal;Tactile  -GC      Reps 12 25  -GC      Time 12 3#  -GC         Exercise 13    Exercise Name 13 Prone Hor ABD 90  -GC      Cueing 13 Verbal;Tactile;Demo  -GC      Reps 13 25  -GC      Time 13 1#  -GC         Exercise 14    Exercise Name 14 Prone Hor   -GC      Cueing 14 Verbal;Tactile;Demo  -GC      Reps 14 25  -GC      Time 14 1#  -GC            User Key  (r) = Recorded By, (t) = Taken By, (c) = Cosigned By    Initials Name Provider Type    GC Orlando Mccracken PT Physical Therapist                         Manual Rx (last 36 hours)     Manual Treatments     Row Name 11/01/22 1130             Manual Rx 1    Manual Rx 1 Location left shoulder  -GC      Manual Rx 1 Type passive stretches in FLEX-ABD-ER-IR  -GC      Manual Rx 1 Duration 15 min  -GC            User Key  (r)  = Recorded By, (t) = Taken By, (c) = Cosigned By    Initials Name Provider Type     Orlando Mccracken, PT Physical Therapist                         Outcome Measure Options: Quick DASH         Time Calculation:   Start Time: 1130  Stop Time: 1227  Time Calculation (min): 57 min  Untimed Charges  PT Moist Heat Minutes: 10  Total Minutes  Untimed Charges Total Minutes: 10   Total Minutes: 10  Therapy Charges for Today     Code Description Service Date Service Provider Modifiers Qty    02814161858 HC PT THER PROC EA 15 MIN 11/1/2022 Orlando Mccracken, PT GP 1    92548966712 HC PT MANUAL THERAPY EA 15 MIN 11/1/2022 Orlando Mccracken, PT GP 1          PT G-Codes  Outcome Measure Options: Raymond Mccrackne PT  11/1/2022

## 2022-11-09 ENCOUNTER — HOSPITAL ENCOUNTER (OUTPATIENT)
Dept: PHYSICAL THERAPY | Facility: HOSPITAL | Age: 62
Setting detail: THERAPIES SERIES
Discharge: HOME | End: 2022-11-09

## 2022-11-09 DIAGNOSIS — Z96.612 S/P REVERSE TOTAL SHOULDER ARTHROPLASTY, LEFT: Primary | ICD-10-CM

## 2022-11-09 PROCEDURE — 97140 MANUAL THERAPY 1/> REGIONS: CPT | Performed by: PHYSICAL THERAPIST

## 2022-11-09 PROCEDURE — 97110 THERAPEUTIC EXERCISES: CPT | Performed by: PHYSICAL THERAPIST

## 2022-11-09 NOTE — THERAPY TREATMENT NOTE
Outpatient Physical Therapy Ortho Treatment Note   [place]     Patient Name: Son Flannery  : 1960  MRN: [id number]  Today's Date: 2022      Visit Date: 2022    Visit Dx:    ICD-10-CM ICD-9-CM   1. S/P reverse total shoulder arthroplasty, left  Z96.612 V43.61       There is no problem list on file for this patient.       No past medical history on file.     No past surgical history on file.                     PT Assessment/Plan     Row Name 22 1200          PT Assessment    Assessment Comments Pt is doing well with excellent ROM and good tolerance to his exercise progression.  -GC        PT Plan    PT Plan Comments Pt is to continue his HEP daily.  -GC           User Key  (r) = Recorded By, (t) = Taken By, (c) = Cosigned By    Initials Name Provider Type    GC Orlando Mccracken, PT Physical Therapist                 Modalities     Row Name 22 1200             Subjective Comments    Subjective Comments Pt states his shoulder is doing well.  -GC         Moist Heat    MH Applied Yes  -GC      Location left shoulder with pt supine and pillow under left elbow and forearm  -GC      PT Moist Heat Minutes 10  -GC      MH Prior to Rx Yes  -GC         Functional Testing    Outcome Measure Options Quick DASH  -GC            User Key  (r) = Recorded By, (t) = Taken By, (c) = Cosigned By    Initials Name Provider Type    GC Orlando Mccracken, PT Physical Therapist               OP Exercises     Row Name 22 1200             Subjective Comments    Subjective Comments Pt states his shoulder is doing well.  -GC         Exercise 1    Exercise Name 1 Pulley-FLEX  -GC      Cueing 1 Verbal;Tactile;Demo  -GC      Time 1 4 min  -GC         Exercise 2    Exercise Name 2 Pulley-scaption  -GC      Cueing 2 Verbal;Tactile  -GC      Time 2 4 min  -GC         Exercise 3    Exercise Name 3 Cane stretch-FLEX  -GC      Cueing 3 Verbal;Tactile;Demo  -GC      Reps 3 15  -GC      Time 3 5 secs  -GC         Exercise  4    Exercise Name 4 Cane stretch-ABD  -GC      Cueing 4 Verbal;Tactile;Demo  -GC      Reps 4 15  -GC      Time 4 5 secs  -GC         Exercise 5    Exercise Name 5 Cane stretch-ER  -GC      Cueing 5 Verbal;Tactile;Demo  -GC      Reps 5 15  -GC      Time 5 5 secs  -GC         Exercise 6    Exercise Name 6 Shoulder ER vs theraband  -GC      Cueing 6 Verbal;Tactile;Demo  -GC      Reps 6 25  -GC      Time 6 blue  -GC         Exercise 7    Exercise Name 7 Shoulder IR vs theraband  -GC      Cueing 7 Verbal;Tactile;Demo  -GC      Reps 7 25  -GC      Time 7 blue  -GC         Exercise 8    Exercise Name 8 Scaption Down  -GC      Cueing 8 Verbal;Tactile;Demo  -GC      Reps 8 25  -GC      Time 8 3#  -GC         Exercise 9    Exercise Name 9 Scaption Up  -GC      Cueing 9 Verbal;Tactile  -GC      Reps 9 25  -GC      Time 9 3#  -GC         Exercise 10    Exercise Name 10 shoulder rows vs theraband  -GC      Cueing 10 Verbal;Tactile;Demo  -GC      Reps 10 25  -GC      Time 10 blue  -GC         Exercise 11    Exercise Name 11 shoulder EXT vs theraband  -GC      Cueing 11 Verbal;Tactile;Demo  -GC      Reps 11 25  -GC      Time 11 blue  -GC         Exercise 12    Exercise Name 12 Sidelying ER  -GC      Cueing 12 Verbal;Tactile  -GC      Reps 12 25  -GC      Time 12 3#  -GC         Exercise 13    Exercise Name 13 Prone Hor ABD 90  -GC      Cueing 13 Verbal;Tactile;Demo  -GC      Reps 13 25  -GC      Time 13 1#  -GC         Exercise 14    Exercise Name 14 Prone Hor   -GC      Cueing 14 Verbal;Tactile;Demo  -GC      Reps 14 25  -GC      Time 14 1#  -GC            User Key  (r) = Recorded By, (t) = Taken By, (c) = Cosigned By    Initials Name Provider Type    GC Orlando Mccracken PT Physical Therapist                         Manual Rx (last 36 hours)     Manual Treatments     Row Name 11/09/22 1200             Manual Rx 1    Manual Rx 1 Location left shoulder  -GC      Manual Rx 1 Type passive stretches in FLEX-ABD-ER-IR  -GC       Manual Rx 1 Duration 15 min  -            User Key  (r) = Recorded By, (t) = Taken By, (c) = Cosigned By    Initials Name Provider Type    GC Orlando Mccracken, PT Physical Therapist                         Outcome Measure Options: Quick DASH         Time Calculation:   Start Time: 1200  Stop Time: 1254  Time Calculation (min): 54 min  Untimed Charges  PT Moist Heat Minutes: 10  Total Minutes  Untimed Charges Total Minutes: 10   Total Minutes: 10  Therapy Charges for Today     Code Description Service Date Service Provider Modifiers Qty    35784777078  PT THER PROC EA 15 MIN 11/9/2022 Orlando Mccracken, PT GP 1    39507517160 HC PT MANUAL THERAPY EA 15 MIN 11/9/2022 Orlando Mccracken, PT GP 1          PT G-Codes  Outcome Measure Options: Quick VALE Mccracken PT  11/9/2022

## 2022-11-16 ENCOUNTER — HOSPITAL ENCOUNTER (OUTPATIENT)
Dept: PHYSICAL THERAPY | Facility: HOSPITAL | Age: 62
Setting detail: THERAPIES SERIES
Discharge: HOME | End: 2022-11-16

## 2022-11-16 DIAGNOSIS — Z96.612 S/P REVERSE TOTAL SHOULDER ARTHROPLASTY, LEFT: Primary | ICD-10-CM

## 2022-11-16 PROCEDURE — 97140 MANUAL THERAPY 1/> REGIONS: CPT | Performed by: PHYSICAL THERAPIST

## 2022-11-16 PROCEDURE — 97110 THERAPEUTIC EXERCISES: CPT | Performed by: PHYSICAL THERAPIST

## 2022-11-16 NOTE — THERAPY TREATMENT NOTE
Outpatient Physical Therapy Ortho Treatment Note   [facility]     Patient Name: Son Flannery  : 1960  MRN: [id number]  Today's Date: 2022      Visit Date: 2022    Visit Dx:    ICD-10-CM ICD-9-CM   1. S/P reverse total shoulder arthroplasty, left  Z96.612 V43.61       There is no problem list on file for this patient.       No past medical history on file.     No past surgical history on file.     PT Ortho     Row Name 22 1100       Subjective Comments    Subjective Comments Pt states his shoulder is holding up pretty well.  -GC          User Key  (r) = Recorded By, (t) = Taken By, (c) = Cosigned By    Initials Name Provider Type    Orlando Snider, PT Physical Therapist                             PT Assessment/Plan     Row Name 22 1100          PT Assessment    Assessment Comments Pt is doing very well with good tolerance to his exercise progression. He has good shoulder range and his strength is improving also.  -GC        PT Plan    PT Plan Comments Pt is to continue his HEP daily.  -GC           User Key  (r) = Recorded By, (t) = Taken By, (c) = Cosigned By    Initials Name Provider Type    Orlando Snider, PT Physical Therapist                 Modalities     Row Name 22 1100             Moist Heat    MH Applied Yes  -GC      Location left shoulder with pt supine and pillow under left elbow and forearm  -GC      PT Moist Heat Minutes 10  -GC      MH Prior to Rx Yes  -GC         Functional Testing    Outcome Measure Options Quick DASH  -GC            User Key  (r) = Recorded By, (t) = Taken By, (c) = Cosigned By    Initials Name Provider Type    Orlando Snider, PT Physical Therapist               OP Exercises     Row Name 22 1100             Subjective Comments    Subjective Comments Pt states his shoulder is holding up pretty well.  -GC         Exercise 1    Exercise Name 1 Pulley-FLEX  -GC      Cueing 1 Verbal;Tactile;Demo  -GC      Time 1 4 min  -GC          Exercise 2    Exercise Name 2 Pulley-scaption  -GC      Cueing 2 Verbal;Tactile  -GC      Time 2 4 min  -GC         Exercise 3    Exercise Name 3 Cane stretch-FLEX  -GC      Cueing 3 Verbal;Tactile;Demo  -GC      Reps 3 15  -GC      Time 3 5 secs  -GC         Exercise 4    Exercise Name 4 Cane stretch-ABD  -GC      Cueing 4 Verbal;Tactile;Demo  -GC      Reps 4 15  -GC      Time 4 5 secs  -GC         Exercise 5    Exercise Name 5 Cane stretch-ER  -GC      Cueing 5 Verbal;Tactile;Demo  -GC      Reps 5 15  -GC      Time 5 5 secs  -GC         Exercise 6    Exercise Name 6 Shoulder ER vs theraband  -GC      Cueing 6 Verbal;Tactile;Demo  -GC      Reps 6 25  -GC      Time 6 black  -GC         Exercise 7    Exercise Name 7 Shoulder IR vs theraband  -GC      Cueing 7 Verbal;Tactile;Demo  -GC      Reps 7 25  -GC      Time 7 black  -GC         Exercise 8    Exercise Name 8 Scaption Down  -GC      Cueing 8 Verbal;Tactile;Demo  -GC      Reps 8 25  -GC      Time 8 4#  -GC         Exercise 9    Exercise Name 9 Scaption Up  -GC      Cueing 9 Verbal;Tactile  -GC      Reps 9 25  -GC      Time 9 4#  -GC         Exercise 10    Exercise Name 10 shoulder rows vs theraband  -GC      Cueing 10 Verbal;Tactile;Demo  -GC      Reps 10 25  -GC      Time 10 black  -GC         Exercise 11    Exercise Name 11 shoulder EXT vs theraband  -GC      Cueing 11 Verbal;Tactile;Demo  -GC      Reps 11 25  -GC      Time 11 black  -GC         Exercise 12    Exercise Name 12 Sidelying ER  -GC      Cueing 12 Verbal;Tactile  -GC      Reps 12 25  -GC      Time 12 4#  -GC         Exercise 13    Exercise Name 13 Prone Hor ABD 90  -GC      Cueing 13 Verbal;Tactile;Demo  -GC      Reps 13 25  -GC      Time 13 4#  -GC         Exercise 14    Exercise Name 14 Prone Hor   -GC      Cueing 14 Verbal;Tactile;Demo  -GC      Reps 14 25  -GC      Time 14 4#  -GC            User Key  (r) = Recorded By, (t) = Taken By, (c) = Cosigned By    Initials Name Provider Type    GC  Orlando Mccracken, PT Physical Therapist                         Manual Rx (last 36 hours)     Manual Treatments     Row Name 11/16/22 1100             Manual Rx 1    Manual Rx 1 Location left shoulder  -GC      Manual Rx 1 Type passive stretches in FLEX-ABD-ER-IR  -GC      Manual Rx 1 Duration 15 min  -GC            User Key  (r) = Recorded By, (t) = Taken By, (c) = Cosigned By    Initials Name Provider Type     Orlando Mccracken, PT Physical Therapist                         Outcome Measure Options: Quick DASH         Time Calculation:   Start Time: 1100  Stop Time: 1152  Time Calculation (min): 52 min  Untimed Charges  PT Moist Heat Minutes: 10  Total Minutes  Untimed Charges Total Minutes: 10   Total Minutes: 10  Therapy Charges for Today     Code Description Service Date Service Provider Modifiers Qty    80955701056 HC PT THER PROC EA 15 MIN 11/16/2022 Orlando Mccracken, PT GP 1    23413435185 HC PT MANUAL THERAPY EA 15 MIN 11/16/2022 Orlando Mccracken, PT GP 1          PT G-Codes  Outcome Measure Options: Quick DASH         Orlando Mccracken PT  11/16/2022

## 2022-11-30 ENCOUNTER — HOSPITAL ENCOUNTER (OUTPATIENT)
Dept: PHYSICAL THERAPY | Facility: HOSPITAL | Age: 62
Setting detail: THERAPIES SERIES
Discharge: HOME | End: 2022-11-30

## 2022-11-30 DIAGNOSIS — Z96.612 S/P REVERSE TOTAL SHOULDER ARTHROPLASTY, LEFT: Primary | ICD-10-CM

## 2022-11-30 PROCEDURE — 97140 MANUAL THERAPY 1/> REGIONS: CPT | Performed by: PHYSICAL THERAPIST

## 2022-11-30 PROCEDURE — 97110 THERAPEUTIC EXERCISES: CPT | Performed by: PHYSICAL THERAPIST

## 2022-12-14 ENCOUNTER — HOSPITAL ENCOUNTER (OUTPATIENT)
Dept: PHYSICAL THERAPY | Facility: HOSPITAL | Age: 62
Setting detail: THERAPIES SERIES
Discharge: HOME | End: 2022-12-14

## 2022-12-14 DIAGNOSIS — Z96.612 S/P REVERSE TOTAL SHOULDER ARTHROPLASTY, LEFT: Primary | ICD-10-CM

## 2022-12-14 PROCEDURE — 97110 THERAPEUTIC EXERCISES: CPT | Performed by: PHYSICAL THERAPIST

## 2022-12-14 PROCEDURE — 97140 MANUAL THERAPY 1/> REGIONS: CPT | Performed by: PHYSICAL THERAPIST

## 2022-12-14 NOTE — THERAPY TREATMENT NOTE
Outpatient Physical Therapy Ortho Treatment Note  [facility] [place]     Patient Name: Son Flannery  : 1960  MRN: [id number]  Today's Date: 2022      Visit Date: 2022    Visit Dx:    ICD-10-CM ICD-9-CM   1. S/P reverse total shoulder arthroplasty, left  Z96.612 V43.61       There is no problem list on file for this patient.       No past medical history on file.     No past surgical history on file.     PT Ortho     Row Name 22 0950       Left Upper Ext    Lt Shoulder Abduction AROM 178 degrees  -GC    Lt Shoulder Flexion AROM 176 degrees  -GC    Lt Shoulder External Rotation AROM 84 degrees  -GC    Lt Shoulder Internal Rotation AROM 82 degrees  -GC       MMT Left Upper Ext    Lt Shoulder Flexion MMT, Gross Movement (5/5) normal  -GC    Lt Shoulder Extension MMT, Gross Movement (5/5) normal  -GC    Lt Shoulder ABduction MMT, Gross Movement (4+/5) good plus  -GC    Lt Shoulder ADduction MMT, Gross Movement (5/5) normal  -GC    Lt Shoulder Internal Rotation MMT, Gross Movement (4+/5) good plus  -GC    Lt Shoulder External Rotation MMT, Gross Movement (4+/5) good plus  -GC    Lt Scapular Elevation MMT, Gross Movement (5/5) normal  -GC    Lt Elbow Flexion MMT, Gross Movement (5/5) normal  -GC    Lt Elbow Extension MMT, Gross Movement (5/5) normal  -GC          User Key  (r) = Recorded By, (t) = Taken By, (c) = Cosigned By    Initials Name Provider Type    Orlando Snider PT Physical Therapist                             PT Assessment/Plan     Row Name 22 0950          PT Assessment    Assessment Comments Pt is oding very well with goals met. Feel he no longer requires skilled therapy services.  -        PT Plan    PT Plan Comments P tis to continue his hEP daily. He has MD follow up next week. Anticipate discharge at that time.  -           User Key  (r) = Recorded By, (t) = Taken By, (c) = Cosigned By    Initials Name Provider Type    Orlando Snider PT Physical Therapist                    OP Exercises     Row Name 12/14/22 0950             Subjective Comments    Subjective Comments Pt states his shoulder is doing well. He denies any trouble with ADLs. He feels like he is ready to return to work.  -GC         Exercise 1    Exercise Name 1 Pulley-FLEX  -GC      Cueing 1 Verbal;Tactile;Demo  -GC      Time 1 4 min  -GC         Exercise 2    Exercise Name 2 Pulley-scaption  -GC      Cueing 2 Verbal;Tactile  -GC      Time 2 4 min  -GC         Exercise 3    Exercise Name 3 Cane stretch-FLEX  -GC      Cueing 3 Verbal;Tactile;Demo  -GC      Reps 3 15  -GC      Time 3 5 secs  -GC         Exercise 4    Exercise Name 4 Cane stretch-ABD  -GC      Cueing 4 Verbal;Tactile;Demo  -GC      Reps 4 15  -GC      Time 4 5 secs  -GC         Exercise 5    Exercise Name 5 Cane stretch-ER  -GC      Cueing 5 Verbal;Tactile;Demo  -GC      Reps 5 15  -GC      Time 5 5 secs  -GC         Exercise 6    Exercise Name 6 Shoulder ER vs theraband  -GC      Cueing 6 Verbal;Tactile;Demo  -GC      Reps 6 25  -GC      Time 6 silver  -GC         Exercise 7    Exercise Name 7 Shoulder IR vs theraband  -GC      Cueing 7 Verbal;Tactile;Demo  -GC      Reps 7 25  -GC      Time 7 silver  -GC         Exercise 8    Exercise Name 8 Scaption Down  -GC      Cueing 8 Verbal;Tactile;Demo  -GC      Reps 8 25  -GC      Time 8 5#  -GC         Exercise 9    Exercise Name 9 Scaption Up  -GC      Cueing 9 Verbal;Tactile  -GC      Reps 9 25  -GC      Time 9 5#  -GC         Exercise 10    Exercise Name 10 shoulder rows vs theraband  -GC      Cueing 10 Verbal;Tactile;Demo  -GC      Reps 10 25  -GC      Time 10 silver  -GC         Exercise 11    Exercise Name 11 shoulder EXT vs theraband  -GC      Cueing 11 Verbal;Tactile;Demo  -GC      Reps 11 25  -GC      Time 11 silver  -GC         Exercise 12    Exercise Name 12 Sidelying ER  -GC      Cueing 12 Verbal;Tactile  -GC      Reps 12 25  -GC      Time 12 5#  -GC         Exercise 13    Exercise Name 13 Prone  Hor ABD 90  -GC      Cueing 13 Verbal;Tactile;Demo  -GC      Reps 13 25  -GC      Time 13 5#  -GC         Exercise 14    Exercise Name 14 Prone Hor   -GC      Cueing 14 Verbal;Tactile;Demo  -GC      Reps 14 25  -GC      Time 14 5#  -GC            User Key  (r) = Recorded By, (t) = Taken By, (c) = Cosigned By    Initials Name Provider Type    GC Orlando Mccracken PT Physical Therapist                              PT OP Goals     Row Name 12/14/22 0950          PT Short Term Goals    STG Date to Achieve 10/04/22  -     STG 1 Decreae left shoulder pain to 4-5/10 with movement.  -     STG 1 Progress Met  -     STG 2 Increase PROM of left shoulder to 145 degrees FLEX and ABD and 60 degrees ER and IR with testing.  -     STG 2 Progress Met  -GC     STG 3 Increase AROM of left shoulder to 125 degrees FLEX and ABD and 45 degrees ER and IR with testing.  -     STG 3 Progress Met  -GC     STG 4 Pt will be indpendent with his HEP issued by this therapist.  -     STG 4 Progress Met  -        Long Term Goals    LTG Date to Achieve 11/01/22  -     LTG 1 Decreae left shoulder pain to 0-1/10 with movement.  -     LTG 1 Progress Met  -     LTG 2 Increase PROM of left shoulder to 160 degrees FLEX and ABD and 75 degrees ER and IR with testing.  -     LTG 2 Progress Met  -GC     LTG 3 Increase AROM of left shoulder to 150 degrees FLEX and ABD and 60 degrees ER and IR with testing.  -     LTG 3 Progress Met  -     LTG 4 Increase left shoulder girdle strength to at least 4+/5 all planes with testing.  -     LTG 4 Progress Met  -     LTG 5 Pt will be independent with all ADLs and have a Quick Dash score < 15.  -     LTG 5 Progress Partially Met  -           User Key  (r) = Recorded By, (t) = Taken By, (c) = Cosigned By    Initials Name Provider Type    Orlando Snider PT Physical Therapist                               Time Calculation:   Start Time: 0950  Stop Time: 1035  Time Calculation (min):  45 min  Therapy Charges for Today     Code Description Service Date Service Provider Modifiers Qty    [id number]  PT THER PROC EA 15 MIN 12/14/2022 Orlando Mccracken, PT GP 1    [id number]  PT MANUAL THERAPY EA 15 MIN 12/14/2022 Orlando Mccracken, PT GP 1                    Orlando Mccracken, PT  12/14/2022

## 2023-10-23 ENCOUNTER — HOSPITAL ENCOUNTER (OUTPATIENT)
Dept: PHYSICAL THERAPY | Facility: HOSPITAL | Age: 63
Setting detail: THERAPIES SERIES
Discharge: HOME | End: 2023-10-23
Payer: COMMERCIAL

## 2023-10-23 DIAGNOSIS — Z98.890 S/P RIGHT KNEE ARTHROSCOPY: Primary | ICD-10-CM

## 2023-10-23 PROCEDURE — 97110 THERAPEUTIC EXERCISES: CPT | Performed by: PHYSICAL THERAPIST

## 2023-10-23 PROCEDURE — 97161 PT EVAL LOW COMPLEX 20 MIN: CPT | Performed by: PHYSICAL THERAPIST

## 2023-10-23 NOTE — THERAPY EVALUATION
Outpatient Physical Therapy Ortho Initial Evaluation  [facility]     Patient Name: Son Flannery  : 1960  MRN: [id number]  Today's Date: 10/23/2023      Visit Date: 10/23/2023    There is no problem list on file for this patient.       No past medical history on file.     No past surgical history on file.    Visit Dx:     ICD-10-CM ICD-9-CM   1. S/P right knee arthroscopy  Z98.890 V45.89          Patient History       Row Name 10/23/23 1100             History    Chief Complaint Difficulty Walking;Difficulty with daily activities;Joint swelling;Pain;Tightness  -GC      Type of Pain Knee pain  right  -GC      Date Current Problem(s) Began 10/18/23  -GC      Brief Description of Current Complaint Pt reports a several year history of right knee for which he has been receiving injections. He then stepped off his deck about a month ago and felt a pop in his knee. He had x-rays and MRI done that showed medial and lateral meniscus tears. He underwent arethroscopy on 10/18. He has been using crutches since surgery. He is now referred for therapy.  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 X-ray;MRI  -GC      Results of Clinical Tests meniscal tears  -GC         Pain     Pain Location Knee  right  -GC      Pain at Present 0  no pain at rest  -GC      Pain at Best 0  -GC      Pain at Worst 4  -GC      Pain Frequency Intermittent  -GC      Pain Description Aching;Discomfort;Sore;Tightness;Tender  -GC      What Performance Factors Make the Current Problem(s) WORSE? Pt c/o pain with being on his feel for long periods  -GC      What Performance Factors Make the Current Problem(s) BETTER? Pt has no pain at rest  -GC       Difficulties at work? Pt is currently not working due to this surgery  -GC      Difficulties with ADL's? Pt has pain with stairs, being on his feet  -GC         Daily Activities    Primary Language English  -GC      Are you able to read Yes  -GC      Are you able to write Yes  -GC      How does patient learn best? Reading  -GC      Teaching needs identified Home Exercise Program;Management of Condition  -GC      Patient is concerned about/has problems with Climbing Stairs;Performing home management (household chores, shopping, care of dependents);Performing job responsibilities/community activities (work, school,;Standing;Walking  -GC      Does patient have problems with the following? None  -GC      Barriers to learning None  -GC      Functional Status mobility issues preventing performance of daily activities  -GC      Pt Participated in POC and Goals Yes  -GC         Safety    Are you being hurt, hit, or frightened by anyone at home or in your life? No  -GC      Are you being neglected by a caregiver No  -GC                User Key  (r) = Recorded By, (t) = Taken By, (c) = Cosigned By      Initials Name Provider Type    GC Orlando Mccracken, MAGALIS Physical Therapist                     PT Ortho       Row Name 10/23/23 1100       Posture/Observations    Posture/Observations Comments Pt has moderate edema right knee. Scope portals are healing nicely with stitches still present. he has mild quad atrophy noted as well.  -GC       Knee Palpation    Patella Right:;Tender  -GC    Medial Joint Line Right:;Tender  -GC    Lateral Joint Line Right:;Tender  -GC       Patellar Accessory Motions    Superior glide Right:;WNL  -GC    Inferior glide Right:;WNL  -GC    Medial glide Right:;WNL  -GC    Lateral glide Right:;WNL  -GC       Knee Special Tests    Tessa’s sign (DVT) Right:;Negative  -GC       Right Lower Ext    Rt Knee Extension/Flexion AROM 0- degrees  -GC       MMT (Manual Muscle Testing)    General MMT Comments TKE <  12 degrees  -GC       MMT Right Lower Ext    Rt Hip Flexion MMT, Gross Movement (4/5) good  -GC    Rt Hip Extension MMT, Gross Movement (4+/5) good plus  -GC    Rt Hip ABduction MMT, Gross Movement (4+/5) good plus  -GC    Rt Hip ADduction MMT, Gross Movement (4/5) good  -GC    Rt Knee Extension MMT, Gross Movement (4/5) good  -GC    Rt Knee Flexion MMT, Gross Movement (4+/5) good plus  -GC    Rt Ankle Plantarflexion MMT, Gross Movement (5/5) normal  -GC    Rt Ankle Dorsiflexion MMT, Gross Movement (5/5) normal  -GC       Sensation    Light Touch No apparent deficits  -GC       Lower Extremity Flexibility    Hamstrings Right:;Moderately limited  -GC    Hip Flexors Right:;WNL  -GC    Quadriceps Right:;Mildly limited  -GC    ITB Right:;WNL  -GC    Gastrocnemius Right:;WNL  -GC       Transfers    Comment, (Transfers) Pt is independent with all bed mobility and transfers  -       Gait/Stairs (Locomotion)    Comment, (Gait/Stairs) Pt ambulates PWBing right LE using 2 crutches  -              User Key  (r) = Recorded By, (t) = Taken By, (c) = Cosigned By      Initials Name Provider Type    GC Orlando Mccracken, PT Physical Therapist                                Therapy Education  Given: HEP, Symptoms/condition management, Pain management  Program: New  How Provided: Verbal, Demonstration, Written  Provided to: Patient  Level of Understanding: Teach back education performed, Verbalized, Demonstrated      PT OP Goals       Row Name 10/23/23 1100          PT Short Term Goals    STG Date to Achieve 11/06/23  -     STG 1 Decrease right knee pain to 2/10 with activity.  -     STG 2 Increase right knee AROM to 0-5-125 degrees with testing.  -     STG 3 Increase right LE strength to at least 4+/5 all planes with testing.  -     STG 4 Pt will be independent with his HEP issued by this therapist.  -        Long Term Goals    LTG Date to Achieve 11/20/23  -     LTG 1 Decrease right knee pain to 0-1/10 with activity.   -     LTG 2 Increase right knee AROM to 0-130 degrees with testing.  -     LTG 3 Increase right LE strength to 5/5 all planes with testing.  -     LTG 4 Pt will ambulate normally on levels and stairs without assistive device.  -     LTG 5 Pt will be independent with all ADLs without pain.  -        Time Calculation    PT Goal Re-Cert Due Date 11/20/23  -               User Key  (r) = Recorded By, (t) = Taken By, (c) = Cosigned By      Initials Name Provider Type     Orlando Mccracken, PT Physical Therapist                     PT Assessment/Plan       Row Name 10/23/23 1100          PT Assessment    Functional Limitations Impaired gait;Limitation in home management;Limitations in community activities;Limitations in functional capacity and performance;Performance in work activities  -     Impairments Range of motion;Pain;Muscle strength;Impaired flexibility;Gait;Edema  -     Assessment Comments Pt presents 5 days s/p right knee arthroscopy for medial and lateral partial menisectomies. He has pain that he rates up to 4/10 with activity. He has decreased right knee ROM, decreased right LE strength, decreased ambulatory status, and decreased function secondary to the above.  -     Rehab Potential Good  -     Patient/caregiver participated in establishment of treatment plan and goals Yes  -     Patient would benefit from skilled therapy intervention Yes  -        PT Plan    PT Frequency 1x/week;2x/week  -     Predicted Duration of Therapy Intervention (PT) 4 weeks  -     Planned CPT's? PT EVAL LOW COMPLEXITY: 29953;PT THER PROC EA 15 MIN: 82739;PT HOT OR COLD PACK TREAT MCARE;PT ELECTRICAL STIM UNATTEND:   -     PT Plan Comments Pt is to continue his HEP 2x daily.  -               User Key  (r) = Recorded By, (t) = Taken By, (c) = Cosigned By      Initials Name Provider Type     Orlando Mccracken PT Physical Therapist                       OP Exercises       Row Name 10/23/23 1100              Exercise 1    Exercise Name 1 hamstring/gastroc stretch  -GC      Reps 1 15  -GC      Time 1 10 secs  -GC         Exercise 2    Exercise Name 2 QS with Russian stim  -GC      Time 2 10 min 10/10  -GC         Exercise 3    Exercise Name 3 SLR  -GC      Reps 3 25  -GC         Exercise 4    Exercise Name 4 Hip ABD  -GC      Reps 4 25  -GC         Exercise 5    Exercise Name 5 SAQ  -GC      Reps 5 25  -GC         Exercise 6    Exercise Name 6 LAQ  -GC      Reps 6 25  -GC         Exercise 7    Exercise Name 7 TKE vs theraband  -GC      Reps 7 25  -GC      Time 7 silver  -GC                User Key  (r) = Recorded By, (t) = Taken By, (c) = Cosigned By      Initials Name Provider Type    Orlando Snider PT Physical Therapist                                  Outcome Measure Options: Lower Extremity Functional Scale (LEFS)  Lower Extremity Functional Index  Any of your usual work, housework or school activities: Quite a bit of difficulty  Your usual hobbies, recreational or sporting activities: Quite a bit of difficulty  Getting into or out of the bath: A little bit of difficulty  Walking between rooms: Moderate difficulty  Putting on your shoes or socks: Moderate difficulty  Squatting: Extreme difficulty or unable to perform activity  Lifting an object, like a bag of groceries from the floor: Moderate difficulty  Performing light activities around your home: Moderate difficulty  Performing heavy activities around your home: Quite a bit of difficulty  Getting into or out of a car: Moderate difficulty  Walking 2 blocks: Quite a bit of difficulty  Walking a mile: Extreme difficulty or unable to perform activity  Going up or down 10 stairs (about 1 flight of stairs): Quite a bit of difficulty  Standing for 1 hour: Quite a bit of difficulty  Sitting for 1 hour: A little bit of difficulty  Running on even ground: Extreme difficulty or unable to perform activity  Running on uneven ground: Extreme difficulty or unable to  perform activity  Making sharp turns while running fast: Extreme difficulty or unable to perform activity  Hopping: Extreme difficulty or unable to perform activity  Rolling over in bed: Moderate difficulty  Total: 24  Lower Extremity Functional Index  Any of your usual work, housework or school activities: Quite a bit of difficulty  Your usual hobbies, recreational or sporting activities: Quite a bit of difficulty  Getting into or out of the bath: A little bit of difficulty  Walking between rooms: Moderate difficulty  Putting on your shoes or socks: Moderate difficulty  Squatting: Extreme difficulty or unable to perform activity  Lifting an object, like a bag of groceries from the floor: Moderate difficulty  Performing light activities around your home: Moderate difficulty  Performing heavy activities around your home: Quite a bit of difficulty  Getting into or out of a car: Moderate difficulty  Walking 2 blocks: Quite a bit of difficulty  Walking a mile: Extreme difficulty or unable to perform activity  Going up or down 10 stairs (about 1 flight of stairs): Quite a bit of difficulty  Standing for 1 hour: Quite a bit of difficulty  Sitting for 1 hour: A little bit of difficulty  Running on even ground: Extreme difficulty or unable to perform activity  Running on uneven ground: Extreme difficulty or unable to perform activity  Making sharp turns while running fast: Extreme difficulty or unable to perform activity  Hopping: Extreme difficulty or unable to perform activity  Rolling over in bed: Moderate difficulty  Total: 24      Time Calculation:     Start Time: 1100  Stop Time: 1200  Time Calculation (min): 60 min     Therapy Charges for Today       Code Description Service Date Service Provider Modifiers Qty    52918510105 HC PT EVAL LOW COMPLEXITY 2 10/23/2023 Orlando Mccracken, PT GP 1    52406129317 HC PT THER PROC EA 15 MIN 10/23/2023 Orlando Mccracken, PT GP 2            PT G-Codes  Outcome Measure Options: Lower  Extremity Functional Scale (LEFS)  Total: 24         Orlando Mccracken, PT  10/23/2023

## 2023-10-31 ENCOUNTER — HOSPITAL ENCOUNTER (OUTPATIENT)
Dept: PHYSICAL THERAPY | Facility: HOSPITAL | Age: 63
Setting detail: THERAPIES SERIES
Discharge: HOME | End: 2023-10-31
Payer: COMMERCIAL

## 2023-10-31 DIAGNOSIS — Z98.890 S/P RIGHT KNEE ARTHROSCOPY: Primary | ICD-10-CM

## 2023-10-31 PROCEDURE — 97110 THERAPEUTIC EXERCISES: CPT | Performed by: PHYSICAL THERAPIST

## 2023-10-31 NOTE — THERAPY TREATMENT NOTE
Outpatient Physical Therapy Ortho Treatment Note   [place]     Patient Name: Son Flannery  : 1960  MRN: [id number]  Today's Date: 10/31/2023      Visit Date: 10/31/2023    Visit Dx:    ICD-10-CM ICD-9-CM   1. S/P right knee arthroscopy  Z98.890 V45.89       There is no problem list on file for this patient.       No past medical history on file.     No past surgical history on file.                     PT Assessment/Plan       Row Name 10/31/23 1125          PT Assessment    Assessment Comments Pt tolerated his exercise progression well.  -GC        PT Plan    PT Plan Comments Pt is to continue his HEP daily.  -GC               User Key  (r) = Recorded By, (t) = Taken By, (c) = Cosigned By      Initials Name Provider Type    GC Orlando Mccracken, PT Physical Therapist                       OP Exercises       Row Name 10/31/23 1125             Subjective    Subjective Comments Pt states he felt so good after his first visit he did some yard work and then had quite a bit of soreness after.  -GC         Subjective Pain    Pre-Treatment Pain Level 2  -GC         Exercise 1    Exercise Name 1 hamstring/gastroc stretch  -GC      Reps 1 15  -GC      Time 1 10 secs  -GC         Exercise 2    Exercise Name 2 QS with Russian stim  -GC      Time 2 10 min 10/10  -GC         Exercise 3    Exercise Name 3 SLR  -GC      Reps 3 25  -GC      Time 3 2#  -GC         Exercise 4    Exercise Name 4 Hip ABD  -GC      Reps 4 25  -GC      Time 4 2#  -GC         Exercise 5    Exercise Name 5 SAQ  -GC      Reps 5 25  -GC      Time 5 2#  -GC         Exercise 6    Exercise Name 6 LAQ  -GC      Reps 6 25  -GC      Time 6 2#  -GC         Exercise 7    Exercise Name 7 TKE vs theraband  -GC      Reps 7 25  -GC      Time 7 gold  -GC                User Key  (r) = Recorded By, (t) = Taken By, (c) = Cosigned By      Initials Name Provider Type    GC Orlando Mccracken, PT Physical Therapist                                                    Time  Calculation:   Start Time: 1125  Stop Time: 1207  Time Calculation (min): 42 min  Therapy Charges for Today       Code Description Service Date Service Provider Modifiers Qty    [id number]  PT THER PROC EA 15 MIN 10/31/2023 Orlando Mccracken, PT GP 2                      Orlando Mccracken, PT  10/31/2023

## 2023-11-06 ENCOUNTER — HOSPITAL ENCOUNTER (OUTPATIENT)
Dept: PHYSICAL THERAPY | Facility: HOSPITAL | Age: 63
Setting detail: THERAPIES SERIES
Discharge: HOME | End: 2023-11-06
Payer: COMMERCIAL

## 2023-11-06 DIAGNOSIS — Z98.890 S/P RIGHT KNEE ARTHROSCOPY: Primary | ICD-10-CM

## 2023-11-06 PROCEDURE — 97110 THERAPEUTIC EXERCISES: CPT | Performed by: PHYSICAL THERAPIST

## 2023-11-06 NOTE — THERAPY TREATMENT NOTE
Outpatient Physical Therapy Ortho Treatment Note  [facility]     Patient Name: Son Flannery  : 1960  MRN: [id number]  Today's Date: 2023      Visit Date: 2023    Visit Dx:    ICD-10-CM ICD-9-CM   1. S/P right knee arthroscopy  Z98.890 V45.89       There is no problem list on file for this patient.       No past medical history on file.     No past surgical history on file.     PT Ortho       Row Name 23 1119       Subjective    Subjective Comments Pt states his knee is doing pretty well.  -GC       Right Lower Ext    Rt Knee Extension/Flexion AROM 0-4-132 degrees  -GC       MMT Right Lower Ext    Rt Hip Flexion MMT, Gross Movement (4+/5) good plus  -GC    Rt Hip Extension MMT, Gross Movement (5/5) normal  -GC    Rt Hip ABduction MMT, Gross Movement (5/5) normal  -GC    Rt Hip ADduction MMT, Gross Movement (4+/5) good plus  -GC    Rt Knee Extension MMT, Gross Movement (4+/5) good plus  -GC    Rt Knee Flexion MMT, Gross Movement (5/5) normal  -GC    Rt Ankle Plantarflexion MMT, Gross Movement (5/5) normal  -GC    Rt Ankle Dorsiflexion MMT, Gross Movement (5/5) normal  -GC       Gait/Stairs (Locomotion)    Comment, (Gait/Stairs) Pt ambulates without assistive device.  -              User Key  (r) = Recorded By, (t) = Taken By, (c) = Cosigned By      Initials Name Provider Type    Orlando Snider PT Physical Therapist                                 PT Assessment/Plan       Row Name 23 1117          PT Assessment    Assessment Comments Pt is doing well with most goals met.  -        PT Plan    PT Plan Comments Pt is to continue his HEP daily. He has follow up with Dr. Ma tomorrow. Will continue as advised.  -               User Key  (r) = Recorded By, (t) = Taken By, (c) = Cosigned By      Initials Name Provider Type    Orlando Snider PT Physical Therapist                       OP Exercises       Row Name 23 1118             Subjective    Subjective Comments Pt  states his knee is doing pretty well.  -GC         Exercise 1    Exercise Name 1 hamstring/gastroc stretch  -GC      Reps 1 15  -GC      Time 1 10 secs  -GC         Exercise 2    Exercise Name 2 QS with Russian stim  -GC      Time 2 10 min 10/10  -GC         Exercise 3    Exercise Name 3 SLR  -GC      Reps 3 25  -GC      Time 3 2#  -GC         Exercise 4    Exercise Name 4 Hip ABD  -GC      Reps 4 25  -GC      Time 4 2#  -GC         Exercise 5    Exercise Name 5 SAQ  -GC      Reps 5 25  -GC      Time 5 2#  -GC         Exercise 6    Exercise Name 6 LAQ  -GC      Reps 6 25  -GC      Time 6 2#  -GC         Exercise 7    Exercise Name 7 TKE vs theraband  -GC      Reps 7 25  -GC      Time 7 gold  -GC                User Key  (r) = Recorded By, (t) = Taken By, (c) = Cosigned By      Initials Name Provider Type    Orlando Snider, PT Physical Therapist                                  PT OP Goals       Row Name 11/06/23 1115          PT Short Term Goals    STG Date to Achieve 11/06/23  -     STG 1 Decrease right knee pain to 2/10 with activity.  -     STG 1 Progress Met  -     STG 2 Increase right knee AROM to 0-5-125 degrees with testing.  -     STG 2 Progress Met  -     STG 3 Increase right LE strength to at least 4+/5 all planes with testing.  -     STG 3 Progress Met  -     STG 4 Pt will be independent with his HEP issued by this therapist.  -     STG 4 Progress Met  -        Long Term Goals    LTG Date to Achieve 11/20/23  -     LTG 1 Decrease right knee pain to 0-1/10 with activity.  -     LTG 1 Progress Met  -     LTG 2 Increase right knee AROM to 0-130 degrees with testing.  -     LTG 2 Progress Partially Met  -GC     LTG 3 Increase right LE strength to 5/5 all planes with testing.  -     LTG 3 Progress Partially Met  -     LTG 4 Pt will ambulate normally on levels and stairs without assistive device.  -     LTG 4 Progress Partially Met  -GC     LTG 5 Pt will be independent with  all ADLs without pain.  -GC     LTG 5 Progress Met  -GC               User Key  (r) = Recorded By, (t) = Taken By, (c) = Cosigned By      Initials Name Provider Type    Orlando Snidre, PT Physical Therapist                                   Time Calculation:   Start Time: 1115  Stop Time: 1154  Time Calculation (min): 39 min  Therapy Charges for Today       Code Description Service Date Service Provider Modifiers Qty    [id number]  PT THER PROC EA 15 MIN 11/6/2023 Orlando Mccracken, PT GP 2                      Orlando Mccracken, PT  11/6/2023